# Patient Record
Sex: FEMALE | Race: WHITE | NOT HISPANIC OR LATINO | Employment: UNEMPLOYED | URBAN - METROPOLITAN AREA
[De-identification: names, ages, dates, MRNs, and addresses within clinical notes are randomized per-mention and may not be internally consistent; named-entity substitution may affect disease eponyms.]

---

## 2024-03-24 ENCOUNTER — APPOINTMENT (OUTPATIENT)
Dept: CT IMAGING | Facility: HOSPITAL | Age: 59
End: 2024-03-24
Payer: MEDICAID

## 2024-03-24 ENCOUNTER — HOSPITAL ENCOUNTER (OUTPATIENT)
Facility: HOSPITAL | Age: 59
Discharge: HOME | End: 2024-03-25
Attending: INTERNAL MEDICINE | Admitting: INTERNAL MEDICINE
Payer: MEDICAID

## 2024-03-24 DIAGNOSIS — R11.2 NAUSEA AND VOMITING, UNSPECIFIED VOMITING TYPE: Primary | ICD-10-CM

## 2024-03-24 DIAGNOSIS — R93.5 ABNORMAL CT OF THE ABDOMEN: ICD-10-CM

## 2024-03-24 PROBLEM — F41.9 ANXIETY DISORDER: Status: ACTIVE | Noted: 2024-03-24

## 2024-03-24 PROBLEM — I21.4 NSTEMI (NON-ST ELEVATED MYOCARDIAL INFARCTION): Status: ACTIVE | Noted: 2024-03-24

## 2024-03-24 PROBLEM — I10 ESSENTIAL HYPERTENSION: Status: ACTIVE | Noted: 2024-03-24

## 2024-03-24 PROBLEM — G89.29 CHRONIC PAIN: Status: ACTIVE | Noted: 2024-03-24

## 2024-03-24 PROBLEM — F32.A DEPRESSION: Status: ACTIVE | Noted: 2024-03-24

## 2024-03-24 PROBLEM — R10.9 ABDOMINAL PAIN: Status: ACTIVE | Noted: 2024-03-24

## 2024-03-24 PROBLEM — K62.5 RECTAL BLEEDING: Status: ACTIVE | Noted: 2024-03-24

## 2024-03-24 LAB
ALBUMIN SERPL-MCNC: 3.4 G/DL (ref 3.5–5.2)
ALBUMIN/GLOB SERPL: 1.3 G/DL
ALP SERPL-CCNC: 90 U/L (ref 39–117)
ALT SERPL W P-5'-P-CCNC: 11 U/L (ref 1–33)
ANION GAP SERPL CALCULATED.3IONS-SCNC: 12 MMOL/L (ref 5–15)
AST SERPL-CCNC: 22 U/L (ref 1–32)
BASOPHILS # BLD AUTO: 0.04 10*3/MM3 (ref 0–0.2)
BASOPHILS NFR BLD AUTO: 0.5 % (ref 0–1.5)
BILIRUB SERPL-MCNC: 0.3 MG/DL (ref 0–1.2)
BUN SERPL-MCNC: 7 MG/DL (ref 6–20)
BUN/CREAT SERPL: 11.9 (ref 7–25)
CALCIUM SPEC-SCNC: 8.8 MG/DL (ref 8.6–10.5)
CHLORIDE SERPL-SCNC: 106 MMOL/L (ref 98–107)
CO2 SERPL-SCNC: 22 MMOL/L (ref 22–29)
CREAT SERPL-MCNC: 0.59 MG/DL (ref 0.57–1)
DEPRECATED RDW RBC AUTO: 51.8 FL (ref 37–54)
EGFRCR SERPLBLD CKD-EPI 2021: 104 ML/MIN/1.73
EOSINOPHIL # BLD AUTO: 0.03 10*3/MM3 (ref 0–0.4)
EOSINOPHIL NFR BLD AUTO: 0.4 % (ref 0.3–6.2)
ERYTHROCYTE [DISTWIDTH] IN BLOOD BY AUTOMATED COUNT: 14.4 % (ref 12.3–15.4)
GLOBULIN UR ELPH-MCNC: 2.6 GM/DL
GLUCOSE SERPL-MCNC: 90 MG/DL (ref 65–99)
HCT VFR BLD AUTO: 44 % (ref 34–46.6)
HGB BLD-MCNC: 13.5 G/DL (ref 12–15.9)
IMM GRANULOCYTES # BLD AUTO: 0.03 10*3/MM3 (ref 0–0.05)
IMM GRANULOCYTES NFR BLD AUTO: 0.4 % (ref 0–0.5)
LYMPHOCYTES # BLD AUTO: 1.92 10*3/MM3 (ref 0.7–3.1)
LYMPHOCYTES NFR BLD AUTO: 22.6 % (ref 19.6–45.3)
MCH RBC QN AUTO: 29.8 PG (ref 26.6–33)
MCHC RBC AUTO-ENTMCNC: 30.7 G/DL (ref 31.5–35.7)
MCV RBC AUTO: 97.1 FL (ref 79–97)
MONOCYTES # BLD AUTO: 0.81 10*3/MM3 (ref 0.1–0.9)
MONOCYTES NFR BLD AUTO: 9.5 % (ref 5–12)
NEUTROPHILS NFR BLD AUTO: 5.66 10*3/MM3 (ref 1.7–7)
NEUTROPHILS NFR BLD AUTO: 66.6 % (ref 42.7–76)
NRBC BLD AUTO-RTO: 0 /100 WBC (ref 0–0.2)
NT-PROBNP SERPL-MCNC: 9110 PG/ML (ref 0–900)
PLATELET # BLD AUTO: 322 10*3/MM3 (ref 140–450)
PMV BLD AUTO: 9.8 FL (ref 6–12)
POTASSIUM SERPL-SCNC: 3.6 MMOL/L (ref 3.5–5.2)
PROT SERPL-MCNC: 6 G/DL (ref 6–8.5)
RBC # BLD AUTO: 4.53 10*6/MM3 (ref 3.77–5.28)
SODIUM SERPL-SCNC: 140 MMOL/L (ref 136–145)
TROPONIN T SERPL HS-MCNC: 26 NG/L
WBC NRBC COR # BLD AUTO: 8.49 10*3/MM3 (ref 3.4–10.8)

## 2024-03-24 PROCEDURE — 25010000002 ONDANSETRON PER 1 MG: Performed by: NURSE PRACTITIONER

## 2024-03-24 PROCEDURE — 74176 CT ABD & PELVIS W/O CONTRAST: CPT

## 2024-03-24 PROCEDURE — 93010 ELECTROCARDIOGRAM REPORT: CPT | Performed by: INTERNAL MEDICINE

## 2024-03-24 PROCEDURE — 25810000003 SODIUM CHLORIDE 0.9 % SOLUTION: Performed by: NURSE PRACTITIONER

## 2024-03-24 PROCEDURE — 83880 ASSAY OF NATRIURETIC PEPTIDE: CPT | Performed by: NURSE PRACTITIONER

## 2024-03-24 PROCEDURE — 93005 ELECTROCARDIOGRAM TRACING: CPT | Performed by: NURSE PRACTITIONER

## 2024-03-24 PROCEDURE — 96374 THER/PROPH/DIAG INJ IV PUSH: CPT

## 2024-03-24 PROCEDURE — 85025 COMPLETE CBC W/AUTO DIFF WBC: CPT | Performed by: NURSE PRACTITIONER

## 2024-03-24 PROCEDURE — 80053 COMPREHEN METABOLIC PANEL: CPT | Performed by: NURSE PRACTITIONER

## 2024-03-24 PROCEDURE — 84484 ASSAY OF TROPONIN QUANT: CPT | Performed by: NURSE PRACTITIONER

## 2024-03-24 PROCEDURE — 25010000002 ONDANSETRON PER 1 MG: Performed by: INTERNAL MEDICINE

## 2024-03-24 PROCEDURE — 25810000003 SODIUM CHLORIDE 0.9 % SOLUTION: Performed by: INTERNAL MEDICINE

## 2024-03-24 PROCEDURE — 96361 HYDRATE IV INFUSION ADD-ON: CPT

## 2024-03-24 RX ORDER — LISINOPRIL 20 MG/1
20 TABLET ORAL DAILY
Status: DISCONTINUED | OUTPATIENT
Start: 2024-03-25 | End: 2024-03-25 | Stop reason: HOSPADM

## 2024-03-24 RX ORDER — ONDANSETRON 2 MG/ML
4 INJECTION INTRAMUSCULAR; INTRAVENOUS EVERY 6 HOURS PRN
Status: DISCONTINUED | OUTPATIENT
Start: 2024-03-24 | End: 2024-03-24 | Stop reason: SDUPTHER

## 2024-03-24 RX ORDER — SODIUM CHLORIDE 0.9 % (FLUSH) 0.9 %
10 SYRINGE (ML) INJECTION AS NEEDED
Status: DISCONTINUED | OUTPATIENT
Start: 2024-03-24 | End: 2024-03-25 | Stop reason: HOSPADM

## 2024-03-24 RX ORDER — SODIUM CHLORIDE 0.9 % (FLUSH) 0.9 %
10 SYRINGE (ML) INJECTION EVERY 12 HOURS SCHEDULED
Status: DISCONTINUED | OUTPATIENT
Start: 2024-03-24 | End: 2024-03-25 | Stop reason: HOSPADM

## 2024-03-24 RX ORDER — ENOXAPARIN SODIUM 100 MG/ML
1 INJECTION SUBCUTANEOUS ONCE
Status: DISCONTINUED | OUTPATIENT
Start: 2024-03-24 | End: 2024-03-24

## 2024-03-24 RX ORDER — PANTOPRAZOLE SODIUM 40 MG/10ML
40 INJECTION, POWDER, LYOPHILIZED, FOR SOLUTION INTRAVENOUS
Status: DISCONTINUED | OUTPATIENT
Start: 2024-03-25 | End: 2024-03-25 | Stop reason: HOSPADM

## 2024-03-24 RX ORDER — BISACODYL 10 MG
10 SUPPOSITORY, RECTAL RECTAL DAILY PRN
Status: DISCONTINUED | OUTPATIENT
Start: 2024-03-24 | End: 2024-03-25 | Stop reason: HOSPADM

## 2024-03-24 RX ORDER — BUPRENORPHINE HYDROCHLORIDE AND NALOXONE HYDROCHLORIDE DIHYDRATE 8; 2 MG/1; MG/1
1 TABLET SUBLINGUAL 2 TIMES DAILY
Status: DISCONTINUED | OUTPATIENT
Start: 2024-03-24 | End: 2024-03-25 | Stop reason: HOSPADM

## 2024-03-24 RX ORDER — ONDANSETRON 2 MG/ML
4 INJECTION INTRAMUSCULAR; INTRAVENOUS EVERY 6 HOURS PRN
Status: DISCONTINUED | OUTPATIENT
Start: 2024-03-24 | End: 2024-03-25 | Stop reason: SDUPTHER

## 2024-03-24 RX ORDER — LISINOPRIL 20 MG/1
20 TABLET ORAL DAILY
COMMUNITY

## 2024-03-24 RX ORDER — FLUOXETINE HYDROCHLORIDE 20 MG/1
40 CAPSULE ORAL DAILY
Status: DISCONTINUED | OUTPATIENT
Start: 2024-03-25 | End: 2024-03-25 | Stop reason: HOSPADM

## 2024-03-24 RX ORDER — NITROGLYCERIN 0.4 MG/1
0.4 TABLET SUBLINGUAL
Status: DISCONTINUED | OUTPATIENT
Start: 2024-03-24 | End: 2024-03-25 | Stop reason: HOSPADM

## 2024-03-24 RX ORDER — SODIUM CHLORIDE 9 MG/ML
40 INJECTION, SOLUTION INTRAVENOUS AS NEEDED
Status: DISCONTINUED | OUTPATIENT
Start: 2024-03-24 | End: 2024-03-25 | Stop reason: HOSPADM

## 2024-03-24 RX ORDER — SODIUM CHLORIDE 9 MG/ML
100 INJECTION, SOLUTION INTRAVENOUS CONTINUOUS
Status: DISCONTINUED | OUTPATIENT
Start: 2024-03-24 | End: 2024-03-25 | Stop reason: HOSPADM

## 2024-03-24 RX ORDER — ONDANSETRON 4 MG/1
4 TABLET, ORALLY DISINTEGRATING ORAL EVERY 6 HOURS PRN
Status: DISCONTINUED | OUTPATIENT
Start: 2024-03-24 | End: 2024-03-25 | Stop reason: SDUPTHER

## 2024-03-24 RX ORDER — AMOXICILLIN 250 MG
2 CAPSULE ORAL 2 TIMES DAILY PRN
Status: DISCONTINUED | OUTPATIENT
Start: 2024-03-24 | End: 2024-03-25 | Stop reason: HOSPADM

## 2024-03-24 RX ORDER — ACETAMINOPHEN 325 MG/1
650 TABLET ORAL EVERY 4 HOURS PRN
Status: DISCONTINUED | OUTPATIENT
Start: 2024-03-24 | End: 2024-03-25 | Stop reason: HOSPADM

## 2024-03-24 RX ORDER — ACETAMINOPHEN 650 MG/1
650 SUPPOSITORY RECTAL EVERY 4 HOURS PRN
Status: DISCONTINUED | OUTPATIENT
Start: 2024-03-24 | End: 2024-03-25 | Stop reason: HOSPADM

## 2024-03-24 RX ORDER — ACETAMINOPHEN 160 MG/5ML
650 SOLUTION ORAL EVERY 4 HOURS PRN
Status: DISCONTINUED | OUTPATIENT
Start: 2024-03-24 | End: 2024-03-25 | Stop reason: HOSPADM

## 2024-03-24 RX ORDER — FLUOXETINE HYDROCHLORIDE 40 MG/1
40 CAPSULE ORAL DAILY
COMMUNITY

## 2024-03-24 RX ORDER — BUPRENORPHINE HYDROCHLORIDE AND NALOXONE HYDROCHLORIDE DIHYDRATE 8; 2 MG/1; MG/1
1 TABLET SUBLINGUAL 2 TIMES DAILY
COMMUNITY

## 2024-03-24 RX ORDER — BISACODYL 5 MG/1
5 TABLET, DELAYED RELEASE ORAL DAILY PRN
Status: DISCONTINUED | OUTPATIENT
Start: 2024-03-24 | End: 2024-03-25 | Stop reason: HOSPADM

## 2024-03-24 RX ORDER — POLYETHYLENE GLYCOL 3350 17 G/17G
17 POWDER, FOR SOLUTION ORAL DAILY PRN
Status: DISCONTINUED | OUTPATIENT
Start: 2024-03-24 | End: 2024-03-25 | Stop reason: HOSPADM

## 2024-03-24 RX ADMIN — Medication 10 ML: at 21:42

## 2024-03-24 RX ADMIN — Medication 10 ML: at 21:43

## 2024-03-24 RX ADMIN — ONDANSETRON 4 MG: 2 INJECTION INTRAMUSCULAR; INTRAVENOUS at 23:00

## 2024-03-24 RX ADMIN — SODIUM CHLORIDE 100 ML/HR: 9 INJECTION, SOLUTION INTRAVENOUS at 21:43

## 2024-03-24 RX ADMIN — BUPRENORPHINE HYDROCHLORIDE AND NALOXONE HYDROCHLORIDE DIHYDRATE 1 TABLET: 8; 2 TABLET SUBLINGUAL at 22:19

## 2024-03-25 ENCOUNTER — ANESTHESIA (OUTPATIENT)
Dept: GASTROENTEROLOGY | Facility: HOSPITAL | Age: 59
End: 2024-03-25
Payer: MEDICAID

## 2024-03-25 ENCOUNTER — INPATIENT HOSPITAL (OUTPATIENT)
Dept: URBAN - METROPOLITAN AREA HOSPITAL 84 | Facility: HOSPITAL | Age: 59
End: 2024-03-25
Payer: COMMERCIAL

## 2024-03-25 ENCOUNTER — ANESTHESIA EVENT (OUTPATIENT)
Dept: GASTROENTEROLOGY | Facility: HOSPITAL | Age: 59
End: 2024-03-25
Payer: MEDICAID

## 2024-03-25 ENCOUNTER — APPOINTMENT (OUTPATIENT)
Dept: OTHER | Facility: HOSPITAL | Age: 59
End: 2024-03-25
Payer: MEDICAID

## 2024-03-25 VITALS
HEART RATE: 83 BPM | SYSTOLIC BLOOD PRESSURE: 134 MMHG | DIASTOLIC BLOOD PRESSURE: 81 MMHG | OXYGEN SATURATION: 95 % | TEMPERATURE: 99 F | BODY MASS INDEX: 27.85 KG/M2 | RESPIRATION RATE: 13 BRPM | HEIGHT: 67 IN | WEIGHT: 177.47 LBS

## 2024-03-25 DIAGNOSIS — R11.14 BILIOUS VOMITING: ICD-10-CM

## 2024-03-25 DIAGNOSIS — R79.89 OTHER SPECIFIED ABNORMAL FINDINGS OF BLOOD CHEMISTRY: ICD-10-CM

## 2024-03-25 DIAGNOSIS — R07.9 CHEST PAIN, UNSPECIFIED: ICD-10-CM

## 2024-03-25 DIAGNOSIS — R10.12 LEFT UPPER QUADRANT PAIN: ICD-10-CM

## 2024-03-25 DIAGNOSIS — K29.50 UNSPECIFIED CHRONIC GASTRITIS WITHOUT BLEEDING: ICD-10-CM

## 2024-03-25 DIAGNOSIS — R93.5 ABNORMAL FINDINGS ON DIAGNOSTIC IMAGING OF OTHER ABDOMINAL R: ICD-10-CM

## 2024-03-25 DIAGNOSIS — R11.2 NAUSEA WITH VOMITING, UNSPECIFIED: ICD-10-CM

## 2024-03-25 DIAGNOSIS — Z90.49 ACQUIRED ABSENCE OF OTHER SPECIFIED PARTS OF DIGESTIVE TRACT: ICD-10-CM

## 2024-03-25 PROBLEM — R10.9 ABDOMINAL PAIN: Status: RESOLVED | Noted: 2024-03-24 | Resolved: 2024-03-25

## 2024-03-25 PROBLEM — F32.A DEPRESSION: Status: RESOLVED | Noted: 2024-03-24 | Resolved: 2024-03-25

## 2024-03-25 PROBLEM — I21.4 NSTEMI (NON-ST ELEVATED MYOCARDIAL INFARCTION): Status: RESOLVED | Noted: 2024-03-24 | Resolved: 2024-03-25

## 2024-03-25 PROBLEM — G89.29 CHRONIC PAIN: Status: RESOLVED | Noted: 2024-03-24 | Resolved: 2024-03-25

## 2024-03-25 PROBLEM — K62.5 RECTAL BLEEDING: Status: RESOLVED | Noted: 2024-03-24 | Resolved: 2024-03-25

## 2024-03-25 PROBLEM — F41.9 ANXIETY DISORDER: Status: RESOLVED | Noted: 2024-03-24 | Resolved: 2024-03-25

## 2024-03-25 LAB
ANION GAP SERPL CALCULATED.3IONS-SCNC: 11 MMOL/L (ref 5–15)
BASOPHILS # BLD AUTO: 0.02 10*3/MM3 (ref 0–0.2)
BASOPHILS NFR BLD AUTO: 0.3 % (ref 0–1.5)
BUN SERPL-MCNC: 6 MG/DL (ref 6–20)
BUN/CREAT SERPL: 11.3 (ref 7–25)
CALCIUM SPEC-SCNC: 8.2 MG/DL (ref 8.6–10.5)
CHLORIDE SERPL-SCNC: 104 MMOL/L (ref 98–107)
CO2 SERPL-SCNC: 24 MMOL/L (ref 22–29)
CREAT SERPL-MCNC: 0.53 MG/DL (ref 0.57–1)
DEPRECATED RDW RBC AUTO: 51.6 FL (ref 37–54)
EGFRCR SERPLBLD CKD-EPI 2021: 106.7 ML/MIN/1.73
EOSINOPHIL # BLD AUTO: 0.07 10*3/MM3 (ref 0–0.4)
EOSINOPHIL NFR BLD AUTO: 1.2 % (ref 0.3–6.2)
ERYTHROCYTE [DISTWIDTH] IN BLOOD BY AUTOMATED COUNT: 14.6 % (ref 12.3–15.4)
GEN 5 2HR TROPONIN T REFLEX: 25 NG/L
GLUCOSE SERPL-MCNC: 82 MG/DL (ref 65–99)
HCT VFR BLD AUTO: 41 % (ref 34–46.6)
HGB BLD-MCNC: 12.6 G/DL (ref 12–15.9)
IMM GRANULOCYTES # BLD AUTO: 0.02 10*3/MM3 (ref 0–0.05)
IMM GRANULOCYTES NFR BLD AUTO: 0.3 % (ref 0–0.5)
LYMPHOCYTES # BLD AUTO: 1.8 10*3/MM3 (ref 0.7–3.1)
LYMPHOCYTES NFR BLD AUTO: 29.7 % (ref 19.6–45.3)
MAGNESIUM SERPL-MCNC: 2.1 MG/DL (ref 1.6–2.6)
MCH RBC QN AUTO: 29.6 PG (ref 26.6–33)
MCHC RBC AUTO-ENTMCNC: 30.7 G/DL (ref 31.5–35.7)
MCV RBC AUTO: 96.5 FL (ref 79–97)
MONOCYTES # BLD AUTO: 0.66 10*3/MM3 (ref 0.1–0.9)
MONOCYTES NFR BLD AUTO: 10.9 % (ref 5–12)
NEUTROPHILS NFR BLD AUTO: 3.5 10*3/MM3 (ref 1.7–7)
NEUTROPHILS NFR BLD AUTO: 57.6 % (ref 42.7–76)
NRBC BLD AUTO-RTO: 0 /100 WBC (ref 0–0.2)
PHOSPHATE SERPL-MCNC: 3.5 MG/DL (ref 2.5–4.5)
PLATELET # BLD AUTO: 298 10*3/MM3 (ref 140–450)
PMV BLD AUTO: 10.1 FL (ref 6–12)
POTASSIUM SERPL-SCNC: 3.5 MMOL/L (ref 3.5–5.2)
QT INTERVAL: 439 MS
QTC INTERVAL: 539 MS
RBC # BLD AUTO: 4.25 10*6/MM3 (ref 3.77–5.28)
SODIUM SERPL-SCNC: 139 MMOL/L (ref 136–145)
TROPONIN T DELTA: -1 NG/L
WBC NRBC COR # BLD AUTO: 6.07 10*3/MM3 (ref 3.4–10.8)

## 2024-03-25 PROCEDURE — 85025 COMPLETE CBC W/AUTO DIFF WBC: CPT | Performed by: STUDENT IN AN ORGANIZED HEALTH CARE EDUCATION/TRAINING PROGRAM

## 2024-03-25 PROCEDURE — 99222 1ST HOSP IP/OBS MODERATE 55: CPT | Mod: 25 | Performed by: NURSE PRACTITIONER

## 2024-03-25 PROCEDURE — 25810000003 SODIUM CHLORIDE 0.9 % SOLUTION: Performed by: INTERNAL MEDICINE

## 2024-03-25 PROCEDURE — 0DB68ZX EXCISION OF STOMACH, VIA NATURAL OR ARTIFICIAL OPENING ENDOSCOPIC, DIAGNOSTIC: ICD-10-PCS | Performed by: INTERNAL MEDICINE

## 2024-03-25 PROCEDURE — 80048 BASIC METABOLIC PNL TOTAL CA: CPT | Performed by: STUDENT IN AN ORGANIZED HEALTH CARE EDUCATION/TRAINING PROGRAM

## 2024-03-25 PROCEDURE — 83735 ASSAY OF MAGNESIUM: CPT | Performed by: STUDENT IN AN ORGANIZED HEALTH CARE EDUCATION/TRAINING PROGRAM

## 2024-03-25 PROCEDURE — 99222 1ST HOSP IP/OBS MODERATE 55: CPT | Performed by: INTERNAL MEDICINE

## 2024-03-25 PROCEDURE — 25010000002 PROPOFOL 200 MG/20ML EMULSION

## 2024-03-25 PROCEDURE — 25810000003 SODIUM CHLORIDE 0.9 % SOLUTION: Performed by: NURSE PRACTITIONER

## 2024-03-25 PROCEDURE — 84100 ASSAY OF PHOSPHORUS: CPT | Performed by: STUDENT IN AN ORGANIZED HEALTH CARE EDUCATION/TRAINING PROGRAM

## 2024-03-25 PROCEDURE — 43239 EGD BIOPSY SINGLE/MULTIPLE: CPT | Performed by: INTERNAL MEDICINE

## 2024-03-25 PROCEDURE — 96361 HYDRATE IV INFUSION ADD-ON: CPT

## 2024-03-25 PROCEDURE — 88305 TISSUE EXAM BY PATHOLOGIST: CPT | Performed by: INTERNAL MEDICINE

## 2024-03-25 PROCEDURE — 84484 ASSAY OF TROPONIN QUANT: CPT | Performed by: NURSE PRACTITIONER

## 2024-03-25 RX ORDER — LIDOCAINE HYDROCHLORIDE 20 MG/ML
INJECTION, SOLUTION INFILTRATION; PERINEURAL AS NEEDED
Status: DISCONTINUED | OUTPATIENT
Start: 2024-03-25 | End: 2024-03-25 | Stop reason: SURG

## 2024-03-25 RX ORDER — ONDANSETRON 4 MG/1
4 TABLET, ORALLY DISINTEGRATING ORAL EVERY 6 HOURS PRN
Status: DISCONTINUED | OUTPATIENT
Start: 2024-03-25 | End: 2024-03-25 | Stop reason: HOSPADM

## 2024-03-25 RX ORDER — TERIPARATIDE 250 UG/ML
20 INJECTION, SOLUTION SUBCUTANEOUS DAILY
COMMUNITY

## 2024-03-25 RX ORDER — PROMETHAZINE HYDROCHLORIDE 12.5 MG/1
12.5 TABLET ORAL EVERY 6 HOURS PRN
COMMUNITY

## 2024-03-25 RX ORDER — PROPOFOL 10 MG/ML
INJECTION, EMULSION INTRAVENOUS AS NEEDED
Status: DISCONTINUED | OUTPATIENT
Start: 2024-03-25 | End: 2024-03-25 | Stop reason: SURG

## 2024-03-25 RX ORDER — ONDANSETRON 4 MG/1
4 TABLET, ORALLY DISINTEGRATING ORAL EVERY 8 HOURS PRN
Qty: 15 TABLET | Refills: 0 | Status: CANCELLED | OUTPATIENT
Start: 2024-03-25 | End: 2024-03-30

## 2024-03-25 RX ORDER — SODIUM CHLORIDE 9 MG/ML
INJECTION, SOLUTION INTRAVENOUS CONTINUOUS PRN
Status: DISCONTINUED | OUTPATIENT
Start: 2024-03-25 | End: 2024-03-25 | Stop reason: SURG

## 2024-03-25 RX ORDER — PANTOPRAZOLE SODIUM 40 MG/1
40 TABLET, DELAYED RELEASE ORAL DAILY
Qty: 30 TABLET | Refills: 0 | Status: SHIPPED | OUTPATIENT
Start: 2024-03-25 | End: 2024-04-24

## 2024-03-25 RX ORDER — ONDANSETRON 4 MG/1
4 TABLET, ORALLY DISINTEGRATING ORAL EVERY 8 HOURS PRN
Qty: 15 TABLET | Refills: 0 | Status: SHIPPED | OUTPATIENT
Start: 2024-03-25 | End: 2024-03-30

## 2024-03-25 RX ORDER — ACETAMINOPHEN 325 MG/1
650 TABLET ORAL EVERY 4 HOURS PRN
Start: 2024-03-25

## 2024-03-25 RX ORDER — ONDANSETRON 2 MG/ML
4 INJECTION INTRAMUSCULAR; INTRAVENOUS EVERY 6 HOURS PRN
Status: DISCONTINUED | OUTPATIENT
Start: 2024-03-25 | End: 2024-03-25 | Stop reason: HOSPADM

## 2024-03-25 RX ORDER — ACETAMINOPHEN 325 MG/1
650 TABLET ORAL EVERY 4 HOURS PRN
Status: CANCELLED
Start: 2024-03-25

## 2024-03-25 RX ADMIN — SODIUM CHLORIDE 100 ML/HR: 9 INJECTION, SOLUTION INTRAVENOUS at 08:32

## 2024-03-25 RX ADMIN — FLUOXETINE 40 MG: 20 CAPSULE ORAL at 08:31

## 2024-03-25 RX ADMIN — PROPOFOL 20 MG: 10 INJECTION, EMULSION INTRAVENOUS at 14:24

## 2024-03-25 RX ADMIN — PROPOFOL 40 MG: 10 INJECTION, EMULSION INTRAVENOUS at 14:27

## 2024-03-25 RX ADMIN — PROPOFOL 40 MG: 10 INJECTION, EMULSION INTRAVENOUS at 14:25

## 2024-03-25 RX ADMIN — LIDOCAINE HYDROCHLORIDE 100 MG: 20 INJECTION, SOLUTION INFILTRATION; PERINEURAL at 14:20

## 2024-03-25 RX ADMIN — LISINOPRIL 20 MG: 20 TABLET ORAL at 08:31

## 2024-03-25 RX ADMIN — SODIUM CHLORIDE: 9 INJECTION, SOLUTION INTRAVENOUS at 14:09

## 2024-03-25 RX ADMIN — PANTOPRAZOLE SODIUM 40 MG: 40 INJECTION, POWDER, FOR SOLUTION INTRAVENOUS at 05:44

## 2024-03-25 RX ADMIN — Medication 10 ML: at 08:32

## 2024-03-25 RX ADMIN — BUPRENORPHINE HYDROCHLORIDE AND NALOXONE HYDROCHLORIDE DIHYDRATE 1 TABLET: 8; 2 TABLET SUBLINGUAL at 08:31

## 2024-03-25 RX ADMIN — PROPOFOL 50 MG: 10 INJECTION, EMULSION INTRAVENOUS at 14:23

## 2024-03-25 RX ADMIN — PROPOFOL 90 MG: 10 INJECTION, EMULSION INTRAVENOUS at 14:20

## 2024-03-25 NOTE — PLAN OF CARE
Goal Outcome Evaluation:  Plan of Care Reviewed With: patient        Progress: no change  Outcome Evaluation: No significant events overnight, c/o some nausea after only a few bites of pudding last night, NPO this AM until seen by MDs. Vitals stable throughout the shift. IV fluids infusing. No c/o chest pain since arrival.

## 2024-03-25 NOTE — PAYOR COMM NOTE
"Clinical for case# [id number]      Inpatient order 3/24/24    [facility] and clinical [facility].   Clinical is limited at this time, UR to fax additional clinical information tomorrow.   ===========  AUTHORIZATION PENDING:   PLEASE FAX OR CALL DETERMINATION TO CONTACT BELOW:       THANK YOU,    MONIQUE Ring, RN  Utilization Review  [facility]  Phone: [phone]  Fax: [phone]      NPI: [id number]  Tax ID: [id number]      Marietta Alvarez (59 y.o. Female)       Date of Birth   1965    Social Security Number       Address   [address] [place]    Home Phone   [phone]    MRN   [id number]       Confucianist   None    Marital Status                               Admission Date   3/24/24    Admission Type   Urgent    Admitting Provider   Devon Clark MD    Attending Provider   Regan Porter MD    Department, Room/Bed   [facility] PROGRESS CARE, 2104/1       Discharge Date       Discharge Disposition       Discharge Destination                                 Attending Provider: Regan Porter MD    Allergies: Tape    Isolation: Spore   Infection: C.difficile (rule out) (03/25/24)   Code Status: CPR    Ht: 170.2 cm (67\")   Wt: 80.5 kg (177 lb 7.5 oz)    Admission Cmt: None   Principal Problem: NSTEMI (non-ST elevated myocardial infarction) [I21.4]                   Active Insurance as of 3/24/2024       Primary Coverage       Payor Plan Insurance Group Employer/Plan Group    [facility] [id number]        Payor Plan Address Payor Plan Phone Number Payor Plan Fax Number Effective Dates    [address]   3/1/2024 - None Entered    [facility] [id number]         Subscriber Name Subscriber Birth Date Member ID       MARIETTA ALVAREZ 1965 [id number]                     Emergency Contacts        (Rel.) Home Phone Work Phone Mobile Phone    MAXI ALVAREZ " (Friend) -- -- [phone]                 History & Physical        Essing-Neha SalgadoNISHI fitzpatrick at 24       Attestation signed by Jania Jasso MD at 24 0326    I have reviewed this documentation and agree.                      [facility]  History & Physical    Patient Name: Marietta Alvarez  : 1965  MRN: [id number]  Primary Care Physician:  Lovely Jarrett MD  Date of admission: 3/24/2024  Date and Time of Service: 3/24/2024 at 2120    Subjective      Chief Complaint: chest pain     History of Present Illness: Marietta Alvarez is a 59 y.o. female with a CMH of anxiety, depression, insomnia, chronic neck pain, hyperlipidemia, hypertension, tobacco abuse who presented to [facility] on 3/24/2024 on transfer from [facility] emergency department where she reports she presented with complaints of abdominal pain, nausea and vomiting.  She also reports she had some rectal bleeding.  She reports she has been to the emergency department there 4 times over the past week and has had CTs done and was told it was her hemorrhoids that were bleeding and she had colitis.  She reports she was given antibiotics which she has finished.  She reports while she was in the ambulance she developed left-sided chest pain and shortness of air.  She denied any dizziness.  She denies any chest pain now.  Documents from [facility] did not include any CT abdomen and did not include any information as to why patient presented.  She reports she saw a cardiologist years ago for an abnormal rhythm but has never had a cardiac evaluation.  Labs from [facility] showed a troponin of 145 then 148, potassium 3.2 glucose 137 EKG from [facility] shows sinus tachycardia heart rate 102 T wave abnormality lateral leads, chest x-ray per radiology showed no active disease.  Aspirin was given at [facility] as well as a 1 L fluid bolus and Haldol.  Repeat  EKG, high-sensitivity troponin, CMP CBC and CT abdomen have been ordered and pending.  Cardiology was consulted from outlying facility.  Gastroenterology has been consulted.    Review of Systems   Constitutional: Negative.    HENT: Negative.     Eyes: Negative.    Respiratory:  Positive for shortness of breath.    Cardiovascular:  Positive for chest pain.   Gastrointestinal:  Positive for abdominal pain, anal bleeding, nausea and vomiting.   Endocrine: Negative.    Genitourinary: Negative.    Musculoskeletal: Negative.    Skin: Negative.    Allergic/Immunologic: Negative.    Neurological: Negative.    Hematological: Negative.    Psychiatric/Behavioral: Negative.     All other systems reviewed and are negative.      Personal History     Past Medical History:   Diagnosis Date    Anxiety     Depression     Hyperlipidemia     Hypertension        Past Surgical History:   Procedure Laterality Date    APPENDECTOMY      CHOLECYSTECTOMY      HYSTERECTOMY      JOINT REPLACEMENT      NECK SURGERY      TONSILLECTOMY         Family History: family history includes COPD in her father and mother; Hypertension in her father. Otherwise pertinent FHx was reviewed and not pertinent to current issue.    Social History:  reports that she has been smoking cigarettes. She started smoking about 44 years ago. She has a 22.1 pack-year smoking history. She has never used smokeless tobacco. She reports current drug use. She reports that she does not drink alcohol.    Home Medications:  Prior to Admission Medications       Prescriptions Last Dose Informant Patient Reported? Taking?    buprenorphine-naloxone (SUBOXONE) 8-2 MG per SL tablet  Self Yes No    Place 1 tablet under the tongue 2 (Two) Times a Day.    FLUoxetine (PROzac) 20 MG capsule  Self Yes No    Take 2 capsules by mouth Daily.    lisinopril (PRINIVIL,ZESTRIL) 20 MG tablet  Self Yes No    Take 1 tablet by mouth Daily.              Allergies:  Allergies   Allergen Reactions    Tape  Itching       Objective      Vitals:   Temp:  [99.3 °F (37.4 °C)] 99.3 °F (37.4 °C)  Heart Rate:  [92] 92  Resp:  [18] 18  BP: (143)/(97) 143/97  Body mass index is 28.87 kg/m².  Physical Exam  Vitals reviewed.   Constitutional:       Appearance: Normal appearance. She is obese.   HENT:      Head: Normocephalic and atraumatic.      Right Ear: External ear normal.      Left Ear: External ear normal.      Nose: Nose normal.      Mouth/Throat:      Mouth: Mucous membranes are moist.   Eyes:      Extraocular Movements: Extraocular movements intact.   Cardiovascular:      Rate and Rhythm: Normal rate and regular rhythm.      Heart sounds: Normal heart sounds.   Pulmonary:      Effort: Pulmonary effort is normal.      Breath sounds: Normal breath sounds.   Abdominal:      General: Bowel sounds are normal.      Palpations: Abdomen is soft.   Genitourinary:     Rectum: Normal.   Musculoskeletal:         General: Normal range of motion.      Cervical back: Normal range of motion and neck supple.   Skin:     General: Skin is warm and dry.   Neurological:      General: No focal deficit present.      Mental Status: She is alert and oriented to person, place, and time.   Psychiatric:         Mood and Affect: Mood normal.         Behavior: Behavior normal.         Thought Content: Thought content normal.         Judgment: Judgment normal.         Diagnostic Data:  Lab Results (last 24 hours)       Procedure Component Value Units Date/Time    CBC & Differential [[id number]]  (Abnormal) Collected: 03/24/24 2156    Specimen: Blood from Arm, Left Updated: 03/24/24 2209    Narrative:      The following orders were created for panel order CBC & Differential.  Procedure                               Abnormality         Status                     ---------                               -----------         ------                     CBC Auto Differential[[id number]]        Abnormal            Final result                 Please view results  for these tests on the individual orders.    CBC Auto Differential [[id number]]  (Abnormal) Collected: 03/24/24 2156    Specimen: Blood from Arm, Left Updated: 03/24/24 2209     WBC 8.49 10*3/mm3      RBC 4.53 10*6/mm3      Hemoglobin 13.5 g/dL      Hematocrit 44.0 %      MCV 97.1 fL      MCH 29.8 pg      MCHC 30.7 g/dL      RDW 14.4 %      RDW-SD 51.8 fl      MPV 9.8 fL      Platelets 322 10*3/mm3      Neutrophil % 66.6 %      Lymphocyte % 22.6 %      Monocyte % 9.5 %      Eosinophil % 0.4 %      Basophil % 0.5 %      Immature Grans % 0.4 %      Neutrophils, Absolute 5.66 10*3/mm3      Lymphocytes, Absolute 1.92 10*3/mm3      Monocytes, Absolute 0.81 10*3/mm3      Eosinophils, Absolute 0.03 10*3/mm3      Basophils, Absolute 0.04 10*3/mm3      Immature Grans, Absolute 0.03 10*3/mm3      nRBC 0.0 /100 WBC     High Sensitivity Troponin T [[id number]] Collected: 03/24/24 2156    Specimen: Blood from Arm, Left Updated: 03/24/24 2202    Comprehensive Metabolic Panel [[id number]] Collected: 03/24/24 2156    Specimen: Blood from Arm, Left Updated: 03/24/24 2202    BNP [[id number]] Collected: 03/24/24 2156    Specimen: Blood from Arm, Left Updated: 03/24/24 2202             Imaging Results (Last 24 Hours)       ** No results found for the last 24 hours. **              Assessment & Plan        This is a 59 y.o. female with:    Active and Resolved Problems  Active Hospital Problems    Diagnosis  POA    **NSTEMI (non-ST elevated myocardial infarction) [I21.4]  Yes     Priority: High    Abdominal pain [R10.9]  Yes     Priority: Medium    Rectal bleeding [K62.5]  Yes     Priority: Medium    Depression [F32.A]  Yes    Anxiety disorder [F41.9]  Yes    Chronic pain [G89.29]  Yes    Essential hypertension [I10]  Yes      Resolved Hospital Problems   No resolved problems to display.     Non-STEMI with chest pain and shortness of air now resolved without intervention troponin 145 then 148 per outlying facility, repeat troponin and EKG  ordered here and pending, [facility], no Lovenox or heparin given rectal bleeding, 2D echo in a.m.    Abdominal pain, patient reports she has been dealing with colitis finished antibiotics WBC [facility] CBC pending, gastroenterology consulted, 4 mg IV Zofran every 6 hours as needed nausea    Rectal bleeding, patient reports she was told it was from hemorrhoids, [facility] CBC pending gastroenterology consulted    Depression/anxiety, on home Prozac reordered    Chronic pain, on home Suboxone verified by sidebar summary recent prescription reordered    Essential hypertension, reordered home lisinopril, monitor BP    DVT prophylaxis:  Mechanical DVT prophylaxis orders are present.        The patient desires to be as follows:    CODE STATUS:    Code Status (Patient has no pulse and is not breathing): CPR (Attempt to Resuscitate)  Medical Interventions (Patient has pulse or is breathing): Full Support          Admission Status:  I believe this patient meets inpatient  status.    Expected Length of Stay: pending clinical course     PDMP and Medication Dispenses via Sidebar reviewed and consistent with patient reported medications.    I discussed the patient's findings and my recommendations with patient.      Signature:     This document has been electronically signed by NISHI Issa on March 24, 2024 22:12 EDT   [facility] Hospitalist Team    Electronically signed by Jania Jasso MD at 03/25/24 0323       Vital Signs (last day)       Date/Time Temp Temp src Pulse Resp BP Patient Position SpO2    03/25/24 1300 98.5 (36.9) Oral 83 17 110/68 Lying 95    03/25/24 0900 97.6 (36.4) Axillary 86 15 141/95 Lying 94    03/25/24 0515 98 (36.7) Oral 83 14 103/63 Lying 91    03/25/24 0400 -- -- 80 -- -- -- 89    03/25/24 0148 97.9 (36.6) Oral 88 17 117/83 Lying 94     03/24/24 2200 -- -- 98 -- 132/92 -- 97    03/24/24 2100 -- -- 91 -- 136/97 -- 96    03/24/24 2048 99.3 (37.4) Oral 92 18 143/97 Lying 96          Oxygen Therapy (last day)       Date/Time SpO2 Device (Oxygen Therapy) Flow (L/min) Oxygen Concentration (%) ETCO2 (mmHg)    03/25/24 1300 95 room air -- -- --    03/25/24 1215 -- room air -- -- --    03/25/24 0900 94 room air -- -- --    03/25/24 0815 -- room air -- -- --    03/25/24 0515 91 room air -- -- --    03/25/24 0400 89 -- -- -- --    03/25/24 0148 94 room air -- -- --    03/24/24 2200 97 -- -- -- --    03/24/24 2100 96 room air -- -- --    03/24/24 2048 96 room air -- -- --          Facility-Administered Medications as of 3/25/2024   Medication Dose Route Frequency Provider Last Rate Last Admin    acetaminophen (TYLENOL) tablet 650 mg  650 mg Oral Q4H PRN Yanci Walters APRN        Or    acetaminophen (TYLENOL) 160 MG/5ML oral solution 650 mg  650 mg Oral Q4H PRN Yanci Walters APRN        Or    acetaminophen (TYLENOL) suppository 650 mg  650 mg Rectal Q4H PRN Yanci Walters APRN        sennosides-docusate (PERICOLACE) 8.6-50 MG per tablet 2 tablet  2 tablet Oral BID PRN Yanci Walters APRN        And    polyethylene glycol (MIRALAX) packet 17 g  17 g Oral Daily PRN Yanci Walters APRN        And    bisacodyl (DULCOLAX) EC tablet 5 mg  5 mg Oral Daily PRN Yanci Walters APRN        And    bisacodyl (DULCOLAX) suppository 10 mg  10 mg Rectal Daily PRN Yanci Walters APRN        buprenorphine-naloxone (SUBOXONE) 8-2 MG per SL tablet 1 tablet  1 tablet Sublingual BID Yanci Walters APRN   1 tablet at 03/25/24 0831    FLUoxetine (PROzac) capsule 40 mg  40 mg Oral Daily Yanci Walters APRN   40 mg at 03/25/24 0831    lisinopril (PRINIVIL,ZESTRIL) tablet 20 mg  20 mg Oral Daily Yanci Walters APRN   20 mg at 03/25/24 0831    nitroglycerin (NITROSTAT) SL tablet 0.4 mg  0.4 mg Sublingual Q5  Min PRN Renato-Yanci Salgado APRN        ondansetron ODT (ZOFRAN-ODT) disintegrating tablet 4 mg  4 mg Oral Q6H PRN Renato-Yanci Salgado APRN        Or    ondansetron (ZOFRAN) injection 4 mg  4 mg Intravenous Q6H PRN Renato-Yanci Salgado APRN   4 mg at 03/24/24 2300    pantoprazole (PROTONIX) injection 40 mg  40 mg Intravenous Q AM [place]-Yanci Salgado APRN   40 mg at 03/25/24 0544    sodium chloride 0.9 % flush 10 mL  10 mL Intravenous Q12H Essing-Naomi, Yanci, APRN   10 mL at 03/25/24 0832    sodium chloride 0.9 % flush 10 mL  10 mL Intravenous PRN Essing-Naomi, Yanci, APRN   10 mL at 03/24/24 2142    sodium chloride 0.9 % infusion 40 mL  40 mL Intravenous PRN Sylvestering-Yanci Salgado, APRN        sodium chloride 0.9 % infusion  100 mL/hr Intravenous Continuous Renato-Yanci Salgado APRN 100 mL/hr at 03/25/24 0832 100 mL/hr at 03/25/24 0832     Lab Results (last 24 hours)       Procedure Component Value Units Date/Time    Phosphorus [[id number]]  (Normal) Collected: 03/25/24 0912    Specimen: Blood Updated: 03/25/24 0951     Phosphorus 3.5 mg/dL     Magnesium [[id number]]  (Normal) Collected: 03/25/24 0912    Specimen: Blood Updated: 03/25/24 0951     Magnesium 2.1 mg/dL     Basic Metabolic Panel [[id number]]  (Abnormal) Collected: 03/25/24 0912    Specimen: Blood Updated: 03/25/24 0951     Glucose 82 mg/dL      BUN 6 mg/dL      Creatinine 0.53 mg/dL      Sodium 139 mmol/L      Potassium 3.5 mmol/L      Comment: Slight hemolysis detected by analyzer. Result may be falsely elevated.        Chloride 104 mmol/L      CO2 24.0 mmol/L      Calcium 8.2 mg/dL      BUN/Creatinine Ratio 11.3     Anion Gap 11.0 mmol/L      eGFR 106.7 mL/min/1.73     Narrative:      GFR Normal >60  Chronic Kidney Disease <60  Kidney Failure <15      CBC & Differential [[id number]]  (Abnormal) Collected: 03/25/24 0912    Specimen: Blood Updated: 03/25/24 0928    Narrative:      The following orders were created for panel order CBC &  Differential.  Procedure                               Abnormality         Status                     ---------                               -----------         ------                     CBC Auto Differential[[id number]]        Abnormal            Final result                 Please view results for these tests on the individual orders.    CBC Auto Differential [[id number]]  (Abnormal) Collected: 03/25/24 0912    Specimen: Blood Updated: 03/25/24 0928     WBC 6.07 10*3/mm3      RBC 4.25 10*6/mm3      Hemoglobin 12.6 g/dL      Hematocrit 41.0 %      MCV 96.5 fL      MCH 29.6 pg      MCHC 30.7 g/dL      RDW 14.6 %      RDW-SD 51.6 fl      MPV 10.1 fL      Platelets 298 10*3/mm3      Neutrophil % 57.6 %      Lymphocyte % 29.7 %      Monocyte % 10.9 %      Eosinophil % 1.2 %      Basophil % 0.3 %      Immature Grans % 0.3 %      Neutrophils, Absolute 3.50 10*3/mm3      Lymphocytes, Absolute 1.80 10*3/mm3      Monocytes, Absolute 0.66 10*3/mm3      Eosinophils, Absolute 0.07 10*3/mm3      Basophils, Absolute 0.02 10*3/mm3      Immature Grans, Absolute 0.02 10*3/mm3      nRBC 0.0 /100 WBC     High Sensitivity Troponin T 2Hr [[id number]]  (Abnormal) Collected: 03/25/24 0001    Specimen: Blood Updated: 03/25/24 0037     HS Troponin T 25 ng/L      Troponin T Delta -1 ng/L     Narrative:      High Sensitive Troponin T Reference Range:  <14.0 ng/L- Negative Female for AMI  <22.0 ng/L- Negative Male for AMI  >=14 - Abnormal Female indicating possible myocardial injury.  >=22 - Abnormal Male indicating possible myocardial injury.   Clinicians would have to utilize clinical acumen, EKG, Troponin, and serial changes to determine if it is an Acute Myocardial Infarction or myocardial injury due to an underlying chronic condition.         High Sensitivity Troponin T [[id number]]  (Abnormal) Collected: 03/24/24 2156    Specimen: Blood from Arm, Left Updated: 03/24/24 2236     HS Troponin T 26 ng/L     Narrative:      High Sensitive  Troponin T Reference Range:  <14.0 ng/L- Negative Female for AMI  <22.0 ng/L- Negative Male for AMI  >=14 - Abnormal Female indicating possible myocardial injury.  >=22 - Abnormal Male indicating possible myocardial injury.   Clinicians would have to utilize clinical acumen, EKG, Troponin, and serial changes to determine if it is an Acute Myocardial Infarction or myocardial injury due to an underlying chronic condition.         Comprehensive Metabolic Panel [[id number]]  (Abnormal) Collected: 03/24/24 2156    Specimen: Blood from Arm, Left Updated: 03/24/24 2236     Glucose 90 mg/dL      BUN 7 mg/dL      Creatinine 0.59 mg/dL      Sodium 140 mmol/L      Potassium 3.6 mmol/L      Chloride 106 mmol/L      CO2 22.0 mmol/L      Calcium 8.8 mg/dL      Total Protein 6.0 g/dL      Albumin 3.4 g/dL      ALT (SGPT) 11 U/L      AST (SGOT) 22 U/L      Alkaline Phosphatase 90 U/L      Total Bilirubin 0.3 mg/dL      Globulin 2.6 gm/dL      A/G Ratio 1.3 g/dL      BUN/Creatinine Ratio 11.9     Anion Gap 12.0 mmol/L      eGFR 104.0 mL/min/1.73     Narrative:      GFR Normal >60  Chronic Kidney Disease <60  Kidney Failure <15      BNP [[id number]]  (Abnormal) Collected: 03/24/24 2156    Specimen: Blood from Arm, Left Updated: 03/24/24 2236     proBNP 9,110.0 pg/mL     Narrative:      This assay is used as an aid in the diagnosis of individuals suspected of having heart failure. It can be used as an aid in the diagnosis of acute decompensated heart failure (ADHF) in patients presenting with signs and symptoms of ADHF to the emergency department (ED). In addition, NT-proBNP of <300 pg/mL indicates ADHF is not likely.    Age Range Result Interpretation  NT-proBNP Concentration (pg/mL:      <50             Positive            >450                   Gray                 300-450                    Negative             <300    50-75           Positive            >900                  Gray                300-900                  Negative             <300      >75             Positive            >1800                  Gray                300-1800                  Negative            <300    CBC & Differential [[id number]]  (Abnormal) Collected: 03/24/24 2156    Specimen: Blood from Arm, Left Updated: 03/24/24 2209    Narrative:      The following orders were created for panel order CBC & Differential.  Procedure                               Abnormality         Status                     ---------                               -----------         ------                     CBC Auto Differential[[id number]]        Abnormal            Final result                 Please view results for these tests on the individual orders.    CBC Auto Differential [[id number]]  (Abnormal) Collected: 03/24/24 2156    Specimen: Blood from Arm, Left Updated: 03/24/24 2209     WBC 8.49 10*3/mm3      RBC 4.53 10*6/mm3      Hemoglobin 13.5 g/dL      Hematocrit 44.0 %      MCV 97.1 fL      MCH 29.8 pg      MCHC 30.7 g/dL      RDW 14.4 %      RDW-SD 51.8 fl      MPV 9.8 fL      Platelets 322 10*3/mm3      Neutrophil % 66.6 %      Lymphocyte % 22.6 %      Monocyte % 9.5 %      Eosinophil % 0.4 %      Basophil % 0.5 %      Immature Grans % 0.4 %      Neutrophils, Absolute 5.66 10*3/mm3      Lymphocytes, Absolute 1.92 10*3/mm3      Monocytes, Absolute 0.81 10*3/mm3      Eosinophils, Absolute 0.03 10*3/mm3      Basophils, Absolute 0.04 10*3/mm3      Immature Grans, Absolute 0.03 10*3/mm3      nRBC 0.0 /100 WBC           Imaging Results (Last 48 Hours)       Procedure Component Value Units Date/Time    XR Outside Films [[id number]] Resulted: 03/25/24 0645     Updated: 03/25/24 0645    Narrative:      This procedure was auto-finalized with no dictation required.    XR Outside Films [[id number]] Resulted: 03/25/24 0644     Updated: 03/25/24 0644    Narrative:      This procedure was auto-finalized with no dictation required.    CT Abdomen Pelvis Without Contrast [[id number]] Collected:  03/24/24 2252     Updated: 03/25/24 0547    Narrative:      CT ABDOMEN PELVIS WO CONTRAST    Date of Exam: 3/24/2024 10:32 PM EDT    Indication: Abdominal pain, acute, nonlocalized.    Comparison: None available.    Technique: Axial CT images were obtained of the abdomen and pelvis without the administration of contrast. Sagittal and coronal reconstructions were performed.  Automated exposure control and iterative reconstruction methods were used.      Findings:  There is mild dependent bibasilar atelectasis. The distal esophagus is unremarkable. The heart size is normal. There are no acute findings in the superficial soft tissues. There are no acute osseous abnormalities or destructive bone lesions. There are   mild thoracolumbar degenerative changes.    The liver is homogeneous. Patient is status post cholecystectomy. The bile ducts, pancreas, spleen, stomach and adrenal glands appear within normal limits. Kidney parenchyma is homogeneous. No urolithiasis or hydronephrosis. Urinary bladder is   nondistended. Patient is status post hysterectomy. The ovaries are not seen. The appendix is not seen. The colon is unremarkable. Small bowel is nondistended. No ascites, pneumoperitoneum or lymphadenopathy. There is mild aortoiliac atherosclerotic   disease.      Impression:      Impression:  1.No acute abdominal or pelvic abnormality.  2.Status post cholecystectomy and hysterectomy.        Electronically Signed: Dm Nava MD    3/25/2024 5:45 AM EDT    Workstation ID: [id number]          ECG/EMG Results (last 48 hours)       Procedure Component Value Units Date/Time    ECG 12 Lead Chest Pain [[id number]] Collected: 03/24/24 2157     Updated: 03/24/24 2159     QT Interval 439 ms      QTC Interval 539 ms     Narrative:      HEART RATE= 90  bpm  RR Interval= 664  ms  ID Interval= 164  ms  P Horizontal Axis= -17  deg  P Front Axis= 47  deg  QRSD Interval= 84  ms  QT Interval= 439  ms  QTcB= 539  ms  QRS Axis= 69  deg  T  Wave Axis= 146  deg  - ABNORMAL ECG -  Sinus rhythm  Ventricular premature complex  Probable left atrial enlargement  Anterior infarct, age indeterminate  Abnormal T, consider ischemia, lateral leads  Prolonged QT interval  No previous ECG available for comparison  Electronically Signed By:   Date and Time of Study: 2024-03-24 21:57:23          Physician Progress Notes (last 48 hours)  Notes from 03/23/24 1335 through 03/25/24 1335   No notes of this type exist for this encounter.          Consult Notes (last 48 hours)        Fern Elizabeth APRN at 03/25/24 1026        Consult Orders    1. Inpatient Gastroenterology Consult [[id number]] ordered by Yanci Walters APRN at 03/24/24 2205              Attestation signed by Monet Weaver MD at 03/25/24 1241    I have reviewed this documentation and agree.  I, the attending physician, have performed the history taking, physical exam, and medical decision making for this patient.  This is a 59-year-old lady who was admitted as a transfer from [place] where she presented 4-5 times last week due to persistent symptoms.  She complains of left upper quadrant abdominal pain, vomiting, and diarrhea.  Her diarrhea started a week ago and is since resolved.  She did have blood in the stool but that has resolved.  She describes the left upper quadrant abdominal pain as achy, started 2 weeks ago, and is not associated with oral intake or bowel movement.  She has associated shortness of breath.  The pain is not exertional and improved with Zofran.  On exam she is awake and alert no distress with a benign soft nontender nondistended abdomen despite deep palpation.  Labs noted, troponin positive, CBC CMP normal, proBNP 9000.  CT abdomen pelvis noncontrast showed cholecystectomy and hysterectomy.  CT abdomen pelvis 3/16/24 showed possible left-sided colitis and also gastritis and duodenitis.  Follow-up CT 3/20/2024 showed stool in the colon.  Colonoscopy has been obtained  and reviewed from 6/23 which showed stool in the colon and otherwise benign.    1.  Left upper quadrant abdominal pain-may be related to reported history of colitis.  Differential diagnosis includes anginal equivalent.  Recommend cardiology evaluation at some point, timing per cardiology.  Risk actors include hyperlipidemia and smoking.  2.  Bilious emesis-x 2 weeks.  Suspect acute infectious etiology but will plan EGD.  Consider timing of EGD today or tomorrow.  3.  History of diarrhea-resolved  4.  Elevated proBNP  5.  Positive troponin                GI CONSULT  NOTE:    Referring Provider:  Yanci Walters NP    Chief complaint: Abdominal pain, nausea/vomiting    Subjective .     History of present illness: Marietta Alvarez is a 59 y.o. female with history of anxiety/depression, hypertension, hyperlipidemia, opiate abuse on Suboxone, appendectomy, hysterectomy, and cholecystectomy who presents as a transfer from [facility] where she reports 4 ER visits in the past 1 week.  The patient states that she has been having left-sided abdominal pain for the past 2 weeks.  She describes the pain as aching in nature and it only occurs intermittently.  The pain does not seem to be affected by oral intake or bowel movements.  Zofran helps to alleviate the pain somewhat.  She does describe nausea/vomiting following the onset of abdominal pain.  Denies hematemesis or coffee-ground emesis.  States that she had been using NSAIDs in the past,, but denies use since 11/2023.  States that she has been having loose stools 1-2 times daily and has seen some bright red blood per rectum.  No melena.  She does describe some shortness of breath with her abdominal pain.  Denies being around any ill contacts.      Endo History:  Reports previous colonoscopy approximately 5 to 6 months ago by Dr. Kessler in [place].    Past Medical History:  Past Medical History:   Diagnosis Date    Anxiety     Depression     Hyperlipidemia      Hypertension     Osteoporosis        Past Surgical History:  Past Surgical History:   Procedure Laterality Date    APPENDECTOMY      CHOLECYSTECTOMY      HYSTERECTOMY      JOINT REPLACEMENT      NECK SURGERY      TONSILLECTOMY         Social History:  Social History     Tobacco Use    Smoking status: Every Day     Current packs/day: 0.50     Average packs/day: 0.5 packs/day for 44.2 years (22.1 ttl pk-yrs)     Types: Cigarettes     Start date: 1980    Smokeless tobacco: Never   Vaping Use    Vaping status: Never Used   Substance Use Topics    Alcohol use: Never    Drug use: Yes     Comment: On suboxone since November       Family History:  Family History   Problem Relation Age of Onset    COPD Mother     COPD Father     Hypertension Father        Medications:  Medications Prior to Admission   Medication Sig Dispense Refill Last Dose    buprenorphine-naloxone (SUBOXONE) 8-2 MG per SL tablet Place 1 tablet under the tongue 2 (Two) Times a Day.       FLUoxetine (PROzac) 20 MG capsule Take 2 capsules by mouth Daily.       lisinopril (PRINIVIL,ZESTRIL) 20 MG tablet Take 1 tablet by mouth Daily.          Scheduled Meds:buprenorphine-naloxone, 1 tablet, Sublingual, BID  FLUoxetine, 40 mg, Oral, Daily  lisinopril, 20 mg, Oral, Daily  pantoprazole, 40 mg, Intravenous, Q AM  sodium chloride, 10 mL, Intravenous, Q12H      Continuous Infusions:sodium chloride, 100 mL/hr, Last Rate: 100 mL/hr (03/25/24 0832)      PRN Meds:.  acetaminophen **OR** acetaminophen **OR** acetaminophen    senna-docusate sodium **AND** polyethylene glycol **AND** bisacodyl **AND** bisacodyl    nitroglycerin    ondansetron ODT **OR** ondansetron    sodium chloride    sodium chloride    ALLERGIES:  Tape    ROS:  The following systems were reviewed;   Constitution:  No fevers, chills, no unintentional weight loss  Skin: no rash, no jaundice  Eyes:  No blurry vision, no eye pain  HENT:  No change in hearing or smell  Resp:  No cough, dyspnea  CV:  No chest  pain or palpitations  :  No dysuria, hematuria  Musculoskeletal:  No leg cramps or arthralgias  Neuro:  No tremor, no numbness  Psych:  No depression or confusion    Objective     Vital Signs:   Vitals:    03/25/24 0148 03/25/24 0400 03/25/24 0515 03/25/24 0900   BP: 117/83  103/63 141/95   BP Location: Left arm  Left arm Left arm   Patient Position: Lying  Lying Lying   Pulse: 88 80 83 86   Resp: 17  14 15   Temp: 97.9 °F (36.6 °C)  98 °F (36.7 °C) 97.6 °F (36.4 °C)   TempSrc: Oral  Oral Axillary   SpO2: 94% (!) 89% 91% 94%   Weight:   80.5 kg (177 lb 7.5 oz)    Height:           Physical Exam:       General Appearance:    Awake and alert, in no acute distress   Head:    Normocephalic, without obvious abnormality, atraumatic   Throat:   No oral lesions, no thrush, oral mucosa moist   Lungs:     Respirations regular, even and unlabored   Chest Wall:    No abnormalities observed   Abdomen:     Soft, LUQ tenderness, no rebound or guarding, non-distended   Rectal:     Deferred   Extremities:   Moves all extremities, no edema, no cyanosis   Pulses:   Pulses palpable and equal bilaterally   Skin:   No rash, no jaundice, normal palpation   Lymph nodes:   No cervical, supraclavicular or submandibular palpable adenopathy   Neurologic:   Cranial nerves 2 - 12 grossly intact, no asterixis       Results Review:   I reviewed the patient's labs and imaging.  CBC    Results from last 7 days   Lab Units 03/25/24  0912 03/24/24  2156   WBC 10*3/mm3 6.07 8.49   HEMOGLOBIN g/dL 12.6 13.5   PLATELETS 10*3/mm3 298 322     CMP   Results from last 7 days   Lab Units 03/25/24  0912 03/24/24  2156   SODIUM mmol/L 139 140   POTASSIUM mmol/L 3.5 3.6   CHLORIDE mmol/L 104 106   CO2 mmol/L 24.0 22.0   BUN mg/dL 6 7   CREATININE mg/dL 0.53* 0.59   GLUCOSE mg/dL 82 90   ALBUMIN g/dL  --  3.4*   BILIRUBIN mg/dL  --  0.3   ALK PHOS U/L  --  90   AST (SGOT) U/L  --  22   ALT (SGPT) U/L  --  11   MAGNESIUM mg/dL 2.1  --    PHOSPHORUS mg/dL 3.5  --  "     Cr Clearance Estimated Creatinine Clearance: 124.9 mL/min (A) (by C-G formula based on SCr of 0.53 mg/dL (L)).  Coag     HbA1C No results found for: \"HGBA1C\"  Blood Glucose No results found for: \"POCGLU\"  Infection     UA      Imaging Results (Last 72 Hours)       Procedure Component Value Units Date/Time    XR Outside Films [[id number]] Resulted: 03/25/24 0645     Updated: 03/25/24 0645    Narrative:      This procedure was auto-finalized with no dictation required.    XR Outside Films [[id number]] Resulted: 03/25/24 0644     Updated: 03/25/24 0644    Narrative:      This procedure was auto-finalized with no dictation required.    CT Abdomen Pelvis Without Contrast [[id number]] Collected: 03/24/24 2252     Updated: 03/25/24 0547    Narrative:      CT ABDOMEN PELVIS WO CONTRAST    Date of Exam: 3/24/2024 10:32 PM EDT    Indication: Abdominal pain, acute, nonlocalized.    Comparison: None available.    Technique: Axial CT images were obtained of the abdomen and pelvis without the administration of contrast. Sagittal and coronal reconstructions were performed.  Automated exposure control and iterative reconstruction methods were used.      Findings:  There is mild dependent bibasilar atelectasis. The distal esophagus is unremarkable. The heart size is normal. There are no acute findings in the superficial soft tissues. There are no acute osseous abnormalities or destructive bone lesions. There are   mild thoracolumbar degenerative changes.    The liver is homogeneous. Patient is status post cholecystectomy. The bile ducts, pancreas, spleen, stomach and adrenal glands appear within normal limits. Kidney parenchyma is homogeneous. No urolithiasis or hydronephrosis. Urinary bladder is   nondistended. Patient is status post hysterectomy. The ovaries are not seen. The appendix is not seen. The colon is unremarkable. Small bowel is nondistended. No ascites, pneumoperitoneum or lymphadenopathy. There is mild aortoiliac " atherosclerotic   disease.      Impression:      Impression:  1.No acute abdominal or pelvic abnormality.  2.Status post cholecystectomy and hysterectomy.        Electronically Signed: Dm Nava MD    3/25/2024 5:45 AM EDT    Workstation ID: [id number]            ASSESSMENT:  -Left-sided abdominal pain  -Nausea/vomiting  -Diarrhea  -Chest pain/elevated troponin  -Anxiety/depression  -Hypertension  -Hyperlipidemia  -History of opiate addiction on Suboxone  -History of appendectomy  -History of hysterectomy  -History of cholecystectomy    PLAN:  Patient is a 59-year-old female with history of hypertension, hyperlipidemia, and cholecystectomy who presented as a transfer from [facility] with complaints of chest pain, abdominal pain, and nausea/vomiting.  [facility].    CT abdomen/pelvis WO on admission shows no acute abnormality.  [facility] and recent colonoscopy operative report as she reports that this was done 5 to 6 months ago.  Could consider possible EGD tomorrow.  Await cardiology evaluation.  Will check stool studies for infection.  Also check fecal calprotectin.  Antiemetics/analgesics as needed.  Continue PPI.  CBC and CMP unremarkable.  Supportive care.      I discussed the patients findings and my recommendations with the patient.  I will discuss the case with Dr. Weaver and change plan accordingly.    We appreciate the referral.    Electronically signed by NISHI Arboleda, 03/25/24, 10:27 AM EDT.                  Electronically signed by Monet Weaver MD at 03/25/24 0443

## 2024-03-25 NOTE — H&P
[facility] Medicine Services  History & Physical    Patient Name: Marietta Alvarez  : 1965  MRN: [id number]  Primary Care Physician:  Lovely Jarrett MD  Date of admission: 3/24/2024  Date and Time of Service: 3/24/2024 at 2120    Subjective      Chief Complaint: chest pain     History of Present Illness: Marietta Alvarez is a 59 y.o. female with a CMH of anxiety, depression, insomnia, chronic neck pain, hyperlipidemia, hypertension, tobacco abuse who presented to [facility] on 3/24/2024 on transfer from [facility] emergency department where she reports she presented with complaints of abdominal pain, nausea and vomiting.  She also reports she had some rectal bleeding.  She reports she has been to the emergency department there 4 times over the past week and has had CTs done and was told it was her hemorrhoids that were bleeding and she had colitis.  She reports she was given antibiotics which she has finished.  She reports while she was in the ambulance she developed left-sided chest pain and shortness of air.  She denied any dizziness.  She denies any chest pain now.  Documents from [facility] did not include any CT abdomen and did not include any information as to why patient presented.  She reports she saw a cardiologist years ago for an abnormal rhythm but has never had a cardiac evaluation.  Labs from [facility] showed a troponin of 145 then 148, potassium 3.2 glucose 137 EKG from [facility] shows sinus tachycardia heart rate 102 T wave abnormality lateral leads, chest x-ray per radiology showed no active disease.  Aspirin was given at [facility] as well as a 1 L fluid bolus and Haldol.  Repeat EKG, high-sensitivity troponin, CMP CBC and CT abdomen have been ordered and pending.  Cardiology was consulted from [facility].  Gastroenterology has been consulted.    Review of Systems   Constitutional: Negative.    HENT: Negative.      Eyes: Negative.    Respiratory:  Positive for shortness of breath.    Cardiovascular:  Positive for chest pain.   Gastrointestinal:  Positive for abdominal pain, anal bleeding, nausea and vomiting.   Endocrine: Negative.    Genitourinary: Negative.    Musculoskeletal: Negative.    Skin: Negative.    Allergic/Immunologic: Negative.    Neurological: Negative.    Hematological: Negative.    Psychiatric/Behavioral: Negative.     All other systems reviewed and are negative.      Personal History     Past Medical History:   Diagnosis Date    Anxiety     Depression     Hyperlipidemia     Hypertension        Past Surgical History:   Procedure Laterality Date    APPENDECTOMY      CHOLECYSTECTOMY      HYSTERECTOMY      JOINT REPLACEMENT      NECK SURGERY      TONSILLECTOMY         Family History: family history includes COPD in her father and mother; Hypertension in her father. Otherwise pertinent FHx was reviewed and not pertinent to current issue.    Social History:  reports that she has been smoking cigarettes. She started smoking about 44 years ago. She has a 22.1 pack-year smoking history. She has never used smokeless tobacco. She reports current drug use. She reports that she does not drink alcohol.    Home Medications:  Prior to Admission Medications       Prescriptions Last Dose Informant Patient Reported? Taking?    buprenorphine-naloxone (SUBOXONE) 8-2 MG per SL tablet  Self Yes No    Place 1 tablet under the tongue 2 (Two) Times a Day.    FLUoxetine (PROzac) 20 MG capsule  Self Yes No    Take 2 capsules by mouth Daily.    lisinopril (PRINIVIL,ZESTRIL) 20 MG tablet  Self Yes No    Take 1 tablet by mouth Daily.              Allergies:  Allergies   Allergen Reactions    Tape Itching       Objective      Vitals:   Temp:  [99.3 °F (37.4 °C)] 99.3 °F (37.4 °C)  Heart Rate:  [92] 92  Resp:  [18] 18  BP: (143)/(97) 143/97  Body mass index is 28.87 kg/m².  Physical Exam  Vitals reviewed.   Constitutional:        Appearance: Normal appearance. She is obese.   HENT:      Head: Normocephalic and atraumatic.      Right Ear: External ear normal.      Left Ear: External ear normal.      Nose: Nose normal.      Mouth/Throat:      Mouth: Mucous membranes are moist.   Eyes:      Extraocular Movements: Extraocular movements intact.   Cardiovascular:      Rate and Rhythm: Normal rate and regular rhythm.      Heart sounds: Normal heart sounds.   Pulmonary:      Effort: Pulmonary effort is normal.      Breath sounds: Normal breath sounds.   Abdominal:      General: Bowel sounds are normal.      Palpations: Abdomen is soft.   Genitourinary:     Rectum: Normal.   Musculoskeletal:         General: Normal range of motion.      Cervical back: Normal range of motion and neck supple.   Skin:     General: Skin is warm and dry.   Neurological:      General: No focal deficit present.      Mental Status: She is alert and oriented to person, place, and time.   Psychiatric:         Mood and Affect: Mood normal.         Behavior: Behavior normal.         Thought Content: Thought content normal.         Judgment: Judgment normal.         Diagnostic Data:  Lab Results (last 24 hours)       Procedure Component Value Units Date/Time    CBC & Differential [[id number]]  (Abnormal) Collected: 03/24/24 2156    Specimen: Blood from Arm, Left Updated: 03/24/24 2209    Narrative:      The following orders were created for panel order CBC & Differential.  Procedure                               Abnormality         Status                     ---------                               -----------         ------                     CBC Auto Differential[[id number]]        Abnormal            Final result                 Please view results for these tests on the individual orders.    CBC Auto Differential [[id number]]  (Abnormal) Collected: 03/24/24 2156    Specimen: Blood from Arm, Left Updated: 03/24/24 2209     WBC 8.49 10*3/mm3      RBC 4.53 10*6/mm3      Hemoglobin  13.5 g/dL      Hematocrit 44.0 %      MCV 97.1 fL      MCH 29.8 pg      MCHC 30.7 g/dL      RDW 14.4 %      RDW-SD 51.8 fl      MPV 9.8 fL      Platelets 322 10*3/mm3      Neutrophil % 66.6 %      Lymphocyte % 22.6 %      Monocyte % 9.5 %      Eosinophil % 0.4 %      Basophil % 0.5 %      Immature Grans % 0.4 %      Neutrophils, Absolute 5.66 10*3/mm3      Lymphocytes, Absolute 1.92 10*3/mm3      Monocytes, Absolute 0.81 10*3/mm3      Eosinophils, Absolute 0.03 10*3/mm3      Basophils, Absolute 0.04 10*3/mm3      Immature Grans, Absolute 0.03 10*3/mm3      nRBC 0.0 /100 WBC     High Sensitivity Troponin T [[id number]] Collected: 03/24/24 2156    Specimen: Blood from Arm, Left Updated: 03/24/24 2202    Comprehensive Metabolic Panel [[id number]] Collected: 03/24/24 2156    Specimen: Blood from Arm, Left Updated: 03/24/24 2202    BNP [[id number]] Collected: 03/24/24 2156    Specimen: Blood from Arm, Left Updated: 03/24/24 2202             Imaging Results (Last 24 Hours)       ** No results found for the last 24 hours. **              Assessment & Plan        This is a 59 y.o. female with:    Active and Resolved Problems  Active Hospital Problems    Diagnosis  POA    **NSTEMI (non-ST elevated myocardial infarction) [I21.4]  Yes     Priority: High    Abdominal pain [R10.9]  Yes     Priority: Medium    Rectal bleeding [K62.5]  Yes     Priority: Medium    Depression [F32.A]  Yes    Anxiety disorder [F41.9]  Yes    Chronic pain [G89.29]  Yes    Essential hypertension [I10]  Yes      Resolved Hospital Problems   No resolved problems to display.     Non-STEMI with chest pain and shortness of air now resolved without intervention [facility] 145 [facility] repeat troponin and EKG ordered here and pending, cardiology consulted from outlying facility, continuous cardiac monitoring aspirin at outlying facility, no Lovenox or heparin given rectal bleeding, 2D echo in a.m.    Abdominal pain, patient reports she has  been dealing with colitis finished antibiotics WBC not elevated per outlying facility CBC pending, gastroenterology consulted, 4 mg IV Zofran every 6 hours as needed nausea    Rectal bleeding, patient reports she was told it was from hemorrhoids, H&H stable per outlying facility CBC pending gastroenterology consulted    Depression/anxiety, on home Prozac reordered    Chronic pain, on home Suboxone verified by sidebar summary recent prescription reordered    Essential hypertension, reordered home lisinopril, monitor BP    DVT prophylaxis:  Mechanical DVT prophylaxis orders are present.        The patient desires to be as follows:    CODE STATUS:    Code Status (Patient has no pulse and is not breathing): CPR (Attempt to Resuscitate)  Medical Interventions (Patient has pulse or is breathing): Full Support          Admission Status:  I believe this patient meets inpatient  status.    Expected Length of Stay: pending clinical course     PDMP and Medication Dispenses via Sidebar reviewed and consistent with patient reported medications.    I discussed the patient's findings and my recommendations with patient.      Signature:     This document has been electronically signed by NISHI Issa on March 24, 2024 22:12 EDT   [facility], [place], operated by [facility] Hospitalist Team

## 2024-03-25 NOTE — CASE MANAGEMENT/SOCIAL WORK
Discharge Planning Assessment   Skip     Patient Name: Marietta Alvarez  MRN: [id number]  Today's Date: 3/25/2024    Admit Date: 3/24/2024    Plan: Routine home with ex- Satish   Discharge Needs Assessment       Row Name 03/25/24 1453       Living Environment    People in Home other (see comments)    Name(s) of People in Home Satish, ex-    Current Living Arrangements home    Potentially Unsafe Housing Conditions none    In the past 12 months has the electric, gas, oil, or water company threatened to shut off services in your home? No    Primary Care Provided by self    Provides Primary Care For no one    Family Caregiver if Needed spouse    Quality of Family Relationships helpful;involved;supportive    Able to Return to Prior Arrangements yes       Resource/Environmental Concerns    Resource/Environmental Concerns none    Transportation Concerns none       Transportation Needs    In the past 12 months, has lack of transportation kept you from medical appointments or from getting medications? no    In the past 12 months, has lack of transportation kept you from meetings, work, or from getting things needed for daily living? No       Food Insecurity    Within the past 12 months, you worried that your food would run out before you got the money to buy more. Never true    Within the past 12 months, the food you bought just didn't last and you didn't have money to get more. Never true       Transition Planning    Patient/Family Anticipates Transition to home with family    Patient/Family Anticipated Services at Transition none    Transportation Anticipated family or friend will provide       Discharge Needs Assessment    Readmission Within the Last 30 Days no previous admission in last 30 days    Equipment Currently Used at Home none    Concerns to be Addressed no discharge needs identified    Anticipated Changes Related to Illness none    Equipment Needed After Discharge none              Discharge  Plan       Row Name 03/25/24 1455       Plan    Plan Routine home with ex- Satish    Patient/Family in Agreement with Plan yes    Plan Comments CM met with patient at bedside. Confirmed PCP and pharmacy, enrolled in [facility]. IADLs, no DMEs. Pt does not have HH/PT services and denies needing them. Pt lives at home with ex-, who will provide transportation at d/c. Pt denies needing assistance affording food or medication. SDOH completed. DC Barriers: NPO, Cardio consult, IVF.             Expected Discharge Date and Time       Expected Discharge Date Expected Discharge Time    Mar 26, 2024            Demographic Summary       Row Name 03/25/24 1452       General Information    Admission Type inpatient    Arrived From emergency department    Referral Source admission list    Reason for Consult discharge planning    Preferred Language English              Functional Status       Row Name 03/25/24 1453       Functional Status    Usual Activity Tolerance good    Current Activity Tolerance good       Functional Status, IADL    Medications independent    Meal Preparation independent    Housekeeping independent    Laundry independent    Shopping independent       Mental Status    General Appearance WDL WDL       Mental Status Summary    Recent Changes in Mental Status/Cognitive Functioning no changes           Sania Suarez RN    phone [phone]  fax [phone]

## 2024-03-25 NOTE — CONSULTS
GI CONSULT  NOTE:    Referring Provider:  Yanci Walters NP    Chief complaint: Abdominal pain, nausea/vomiting    Subjective .     History of present illness: Marietta Alvarez is a 59 y.o. female with history of anxiety/depression, hypertension, hyperlipidemia, opiate abuse on Suboxone, appendectomy, hysterectomy, and cholecystectomy who presents as a transfer from [facility] where she reports 4 ER visits in the past 1 week.  The patient states that she has been having left-sided abdominal pain for the past 2 weeks.  She describes the pain as aching in nature and it only occurs intermittently.  The pain does not seem to be affected by oral intake or bowel movements.  Zofran helps to alleviate the pain somewhat.  She does describe nausea/vomiting following the onset of abdominal pain.  Denies hematemesis or coffee-ground emesis.  States that she had been using NSAIDs in the past,, but denies use since 11/2023.  States that she has been having loose stools 1-2 times daily and has seen some bright red blood per rectum.  No melena.  She does describe some shortness of breath with her abdominal pain.  Denies being around any ill contacts.      Endo History:  Reports previous colonoscopy approximately 5 to 6 months ago by Dr. Kessler in [place].    Past Medical History:  Past Medical History:   Diagnosis Date    Anxiety     Depression     Hyperlipidemia     Hypertension     Osteoporosis        Past Surgical History:  Past Surgical History:   Procedure Laterality Date    APPENDECTOMY      CHOLECYSTECTOMY      HYSTERECTOMY      JOINT REPLACEMENT      NECK SURGERY      TONSILLECTOMY         Social History:  Social History     Tobacco Use    Smoking status: Every Day     Current packs/day: 0.50     Average packs/day: 0.5 packs/day for 44.2 years (22.1 ttl pk-yrs)     Types: Cigarettes     Start date: 1980    Smokeless tobacco: Never   Vaping Use    Vaping status: Never Used   Substance Use Topics    Alcohol  use: Never    Drug use: Yes     Comment: On suboxone since November       Family History:  Family History   Problem Relation Age of Onset    COPD Mother     COPD Father     Hypertension Father        Medications:  Medications Prior to Admission   Medication Sig Dispense Refill Last Dose    buprenorphine-naloxone (SUBOXONE) 8-2 MG per SL tablet Place 1 tablet under the tongue 2 (Two) Times a Day.       FLUoxetine (PROzac) 20 MG capsule Take 2 capsules by mouth Daily.       lisinopril (PRINIVIL,ZESTRIL) 20 MG tablet Take 1 tablet by mouth Daily.          Scheduled Meds:buprenorphine-naloxone, 1 tablet, Sublingual, BID  FLUoxetine, 40 mg, Oral, Daily  lisinopril, 20 mg, Oral, Daily  pantoprazole, 40 mg, Intravenous, Q AM  sodium chloride, 10 mL, Intravenous, Q12H      Continuous Infusions:sodium chloride, 100 mL/hr, Last Rate: 100 mL/hr (03/25/24 0832)      PRN Meds:.  acetaminophen **OR** acetaminophen **OR** acetaminophen    senna-docusate sodium **AND** polyethylene glycol **AND** bisacodyl **AND** bisacodyl    nitroglycerin    ondansetron ODT **OR** ondansetron    sodium chloride    sodium chloride    ALLERGIES:  Tape    ROS:  The following systems were reviewed;   Constitution:  No fevers, chills, no unintentional weight loss  Skin: no rash, no jaundice  Eyes:  No blurry vision, no eye pain  HENT:  No change in hearing or smell  Resp:  No cough, dyspnea  CV:  No chest pain or palpitations  :  No dysuria, hematuria  Musculoskeletal:  No leg cramps or arthralgias  Neuro:  No tremor, no numbness  Psych:  No depression or confusion    Objective     Vital Signs:   Vitals:    03/25/24 0148 03/25/24 0400 03/25/24 0515 03/25/24 0900   BP: 117/83  103/63 141/95   BP Location: Left arm  Left arm Left arm   Patient Position: Lying  Lying Lying   Pulse: 88 80 83 86   Resp: 17  14 15   Temp: 97.9 °F (36.6 °C)  98 °F (36.7 °C) 97.6 °F (36.4 °C)   TempSrc: Oral  Oral Axillary   SpO2: 94% (!) 89% 91% 94%   Weight:   80.5 kg  "(177 lb 7.5 oz)    Height:           Physical Exam:       General Appearance:    Awake and alert, in no acute distress   Head:    Normocephalic, without obvious abnormality, atraumatic   Throat:   No oral lesions, no thrush, oral mucosa moist   Lungs:     Respirations regular, even and unlabored   Chest Wall:    No abnormalities observed   Abdomen:     Soft, LUQ tenderness, no rebound or guarding, non-distended   Rectal:     Deferred   Extremities:   Moves all extremities, no edema, no cyanosis   Pulses:   Pulses palpable and equal bilaterally   Skin:   No rash, no jaundice, normal palpation   Lymph nodes:   No cervical, supraclavicular or submandibular palpable adenopathy   Neurologic:   Cranial nerves 2 - 12 grossly intact, no asterixis       Results Review:   I reviewed the patient's labs and imaging.  CBC    Results from last 7 days   Lab Units 03/25/24  0912 03/24/24  2156   WBC 10*3/mm3 6.07 8.49   HEMOGLOBIN g/dL 12.6 13.5   PLATELETS 10*3/mm3 298 322     CMP   Results from last 7 days   Lab Units 03/25/24  0912 03/24/24  2156   SODIUM mmol/L 139 140   POTASSIUM mmol/L 3.5 3.6   CHLORIDE mmol/L 104 106   CO2 mmol/L 24.0 22.0   BUN mg/dL 6 7   CREATININE mg/dL 0.53* 0.59   GLUCOSE mg/dL 82 90   ALBUMIN g/dL  --  3.4*   BILIRUBIN mg/dL  --  0.3   ALK PHOS U/L  --  90   AST (SGOT) U/L  --  22   ALT (SGPT) U/L  --  11   MAGNESIUM mg/dL 2.1  --    PHOSPHORUS mg/dL 3.5  --      Cr Clearance Estimated Creatinine Clearance: 124.9 mL/min (A) (by C-G formula based on SCr of 0.53 mg/dL (L)).  Coag     HbA1C No results found for: \"HGBA1C\"  Blood Glucose No results found for: \"POCGLU\"  Infection     UA      Imaging Results (Last 72 Hours)       Procedure Component Value Units Date/Time    XR Outside Films [[id number]] Resulted: 03/25/24 0645     Updated: 03/25/24 0645    Narrative:      This procedure was auto-finalized with no dictation required.    XR Outside Films [[id number]] Resulted: 03/25/24 0644     Updated: " 03/25/24 0644    Narrative:      This procedure was auto-finalized with no dictation required.    CT Abdomen Pelvis Without Contrast [[id number]] Collected: 03/24/24 2252     Updated: 03/25/24 0547    Narrative:      CT ABDOMEN PELVIS WO CONTRAST    Date of Exam: 3/24/2024 10:32 PM EDT    Indication: Abdominal pain, acute, nonlocalized.    Comparison: None available.    Technique: Axial CT images were obtained of the abdomen and pelvis without the administration of contrast. Sagittal and coronal reconstructions were performed.  Automated exposure control and iterative reconstruction methods were used.      Findings:  There is mild dependent bibasilar atelectasis. The distal esophagus is unremarkable. The heart size is normal. There are no acute findings in the superficial soft tissues. There are no acute osseous abnormalities or destructive bone lesions. There are   mild thoracolumbar degenerative changes.    The liver is homogeneous. Patient is status post cholecystectomy. The bile ducts, pancreas, spleen, stomach and adrenal glands appear within normal limits. Kidney parenchyma is homogeneous. No urolithiasis or hydronephrosis. Urinary bladder is   nondistended. Patient is status post hysterectomy. The ovaries are not seen. The appendix is not seen. The colon is unremarkable. Small bowel is nondistended. No ascites, pneumoperitoneum or lymphadenopathy. There is mild aortoiliac atherosclerotic   disease.      Impression:      Impression:  1.No acute abdominal or pelvic abnormality.  2.Status post cholecystectomy and hysterectomy.        Electronically Signed: Dm Nava MD    3/25/2024 5:45 AM EDT    Workstation ID: [id number]            ASSESSMENT:  -Left-sided abdominal pain  -Nausea/vomiting  -Diarrhea  -Chest pain/elevated troponin  -Anxiety/depression  -Hypertension  -Hyperlipidemia  -History of opiate addiction on Suboxone  -History of appendectomy  -History of hysterectomy  -History of  cholecystectomy    PLAN:  Patient is a 59-year-old female with history of hypertension, hyperlipidemia, and cholecystectomy who presented as a transfer from [facility] with complaints of chest pain, abdominal pain, and nausea/vomiting.  Patient reports CT at outlying facility that showed colitis.    CT abdomen/pelvis WO on admission shows no acute abnormality.  We will obtain CT report from outlying facility and recent colonoscopy operative report as she reports that this was done 5 to 6 months ago.  Could consider possible EGD tomorrow.  Await cardiology evaluation.  Will check stool studies for infection.  Also check fecal calprotectin.  Antiemetics/analgesics as needed.  Continue PPI.  CBC and CMP unremarkable.  Supportive care.      I discussed the patients findings and my recommendations with the patient.  I will discuss the case with Dr. Weaver and change plan accordingly.    We appreciate the referral.    Electronically signed by NISHI Arboleda, 03/25/24, 10:27 AM EDT.

## 2024-03-25 NOTE — ANESTHESIA POSTPROCEDURE EVALUATION
Patient: Marietta Alvarez    Procedure Summary       Date: 03/25/24 Room / Location: [facility] 1 / [facility]    Anesthesia Start: 1409 Anesthesia Stop: 1430    Procedure: ESOPHAGOGASTRODUODENOSCOPY WITH BIOPSY X2 AREAS Diagnosis:       Nausea and vomiting, unspecified vomiting type      Abnormal CT of the abdomen      (Nausea and vomiting, unspecified vomiting type [R11.2])      (Abnormal CT of the abdomen [R93.5])    Surgeons: Monet Weaver MD Provider: Jann Doyle MD    Anesthesia Type: general, MAC ASA Status: 3            Anesthesia Type: general, MAC    Vitals  Vitals Value Taken Time   /81 03/25/24 1501   Temp     Pulse 80 03/25/24 1501   Resp 14 03/25/24 1501   SpO2 92 % 03/25/24 1501           Post Anesthesia Care and Evaluation    Patient location during evaluation: PACU  Patient participation: complete - patient participated  Level of consciousness: awake  Pain scale: See nurse's notes for pain score.  Pain management: adequate    Airway patency: patent  Anesthetic complications: No anesthetic complications  PONV Status: none  Cardiovascular status: acceptable  Respiratory status: acceptable and spontaneous ventilation  Hydration status: acceptable    Comments: Patient seen and examined postoperatively; vital signs stable; SpO2 greater than or equal to 90%; cardiopulmonary status stable; nausea/vomiting adequately controlled; pain adequately controlled; no apparent anesthesia complications; patient discharged from anesthesia care when discharge criteria were met

## 2024-03-25 NOTE — DISCHARGE SUMMARY
[facility] Medicine Services  Discharge Summary    Date of Service: 24    Patient Name: Marietta Alvarez  : 1965  MRN: [id number]    Date of Admission: 3/24/2024  Discharge Diagnosis: Gasteritis , chest pain ACS ruled out  Date of Discharge:  24    Primary Care Physician: Lovely Jarrett MD      Presenting Problem:   NSTEMI (non-ST elevated myocardial infarction) [I21.4]    Active and Resolved Hospital Problems:  Elevated troponing  Abd pain   Nausea and vomiting  Abdominal pain  #Rectal bleeding- resolved    Essential hypertension  #History of depression  #History of anxiety  #Chronic pain syndrome       Hospital Course     HPI:  Per the H&P  see HPI    Hospital Course:  Pt presented with abd pain and rectal bleeding. H/H remained stable .GI team was consulted pt had EGD dibe which showed gastritis . Pt was started on PPI IV and d/c with PO and advised to f/u biopsy result as out pt.  Cardiology team was consulted and endorsed pt pain unlikley from ACS; troponin's remained flat and had no chest pain during hospital stay.        DISCHARGE Follow Up Recommendations for labs and diagnostics:   Gi team for biopsy result       Reasons For Change In Medications and Indications for New Medications:      Day of Discharge     Vital Signs:  Temp:  [97.6 °F (36.4 °C)-99.3 °F (37.4 °C)] 99 °F (37.2 °C)  Heart Rate:  [77-98] 83  Resp:  [12-19] 13  BP: (103-143)/(63-97) 134/81  Flow (L/min):  [10] 10    Physical Exam:  Physical Exam  HENT:      Head: Normocephalic.      Mouth/Throat:      Mouth: Mucous membranes are moist.   Eyes:      Pupils: Pupils are equal, round, and reactive to light.   Cardiovascular:      Rate and Rhythm: Normal rate and regular rhythm.   Pulmonary:      Effort: Pulmonary effort is normal.      Breath sounds: Normal breath sounds.   Abdominal:      General: Bowel sounds are normal.      Palpations: Abdomen is soft.   Musculoskeletal:         General: Normal  range of motion.      Cervical back: Neck supple.   Skin:     General: Skin is warm.   Neurological:      General: No focal deficit present.      Mental Status: She is alert and oriented to person, place, and time.   Psychiatric:         Mood and Affect: Mood normal.              Pertinent  and/or Most Recent Results     LAB RESULTS:      Lab 03/25/24  0912 03/24/24 2156   WBC 6.07 8.49   HEMOGLOBIN 12.6 13.5   HEMATOCRIT 41.0 44.0   PLATELETS 298 322   NEUTROS ABS 3.50 5.66   IMMATURE GRANS (ABS) 0.02 0.03   LYMPHS ABS 1.80 1.92   MONOS ABS 0.66 0.81   EOS ABS 0.07 0.03   MCV 96.5 97.1*         Lab 03/25/24  0912 03/24/24 2156   SODIUM 139 140   POTASSIUM 3.5 3.6   CHLORIDE 104 106   CO2 24.0 22.0   ANION GAP 11.0 12.0   BUN 6 7   CREATININE 0.53* 0.59   EGFR 106.7 104.0   GLUCOSE 82 90   CALCIUM 8.2* 8.8   MAGNESIUM 2.1  --    PHOSPHORUS 3.5  --          Lab 03/24/24 2156   TOTAL PROTEIN 6.0   ALBUMIN 3.4*   GLOBULIN 2.6   ALT (SGPT) 11   AST (SGOT) 22   BILIRUBIN 0.3   ALK PHOS 90         Lab 03/25/24  0001 03/24/24 2156   PROBNP  --  9,110.0*   HSTROP T 25* 26*                 Brief Urine Lab Results       None          Microbiology Results (last 10 days)       ** No results found for the last 240 hours. **            CT Abdomen Pelvis Without Contrast    Result Date: 3/25/2024  Impression: Impression: 1.No acute abdominal or pelvic abnormality. 2.Status post cholecystectomy and hysterectomy. Electronically Signed: Dm Nava MD  3/25/2024 5:45 AM EDT  Workstation ID: [id number]                 Labs Pending at Discharge:  Pending Labs       Order Current Status    Tissue Pathology Exam Collected (03/25/24 0874)            Procedures Performed  Procedure(s):  ESOPHAGOGASTRODUODENOSCOPY WITH BIOPSY X2 AREAS  03/25 1403 Upper GI Endoscopy      Consults:   Consults       Date and Time Order Name Status Description    3/24/2024 10:05 PM Inpatient Gastroenterology Consult Completed     3/24/2024  9:15 PM  Inpatient Cardiology Consult                Discharge Details        Discharge Medications        New Medications        Instructions Start Date   acetaminophen 325 MG tablet  Commonly known as: TYLENOL   650 mg, Oral, Every 4 Hours PRN      ondansetron ODT 4 MG disintegrating tablet  Commonly known as: ZOFRAN-ODT   4 mg, Translingual, Every 8 Hours PRN      pantoprazole 40 MG EC tablet  Commonly known as: Protonix   40 mg, Oral, Daily             Continue These Medications        Instructions Start Date   buprenorphine-naloxone 8-2 MG per SL tablet  Commonly known as: SUBOXONE   1 tablet, Sublingual, 2 Times Daily      FLUoxetine 40 MG capsule  Commonly known as: PROzac   40 mg, Oral, Daily      lisinopril 20 MG tablet  Commonly known as: PRINIVIL,ZESTRIL   20 mg, Oral, Daily      promethazine 12.5 MG tablet  Commonly known as: PHENERGAN   12.5 mg, Oral, Every 6 Hours PRN      Teriparatide 600 MCG/2.4ML solution pen-injector   20 mcg, Subcutaneous, Daily               Allergies   Allergen Reactions    Tape Itching         Discharge Disposition:   Home or Self Care    Diet:  Hospital:  Diet Order   Procedures    Diet: Cardiac; Healthy Heart (2-3 Na+); Fluid Consistency: Thin (IDDSI 0)         Discharge Activity:   as tolerated       CODE STATUS:  Code Status and Medical Interventions:   Ordered at: 03/24/24 2113     Code Status (Patient has no pulse and is not breathing):    CPR (Attempt to Resuscitate)     Medical Interventions (Patient has pulse or is breathing):    Full Support         No future appointments.        Time spent on Discharge including face to face service:  >30 minutes    Signature: Electronically signed by Regan Porter MD, 03/25/24, 18:01 EDT.  [facility] Hospitalist Team

## 2024-03-25 NOTE — OP NOTE
ESOPHAGOGASTRODUODENOSCOPY Procedure Report    Patient Name:  Marietta Alvarez  YOB: 1965    Date of Surgery:  3/25/2024     Pre-Op Diagnosis:  Nausea and vomiting, unspecified vomiting type [R11.2]  Abnormal CT of the abdomen [R93.5]  LUQ pain       Post Op Diagnosis:  Gastritis      Procedure/CPT® Codes:      Procedure(s):  ESOPHAGOGASTRODUODENOSCOPY WITH BIOPSY X2 AREAS    Staff:  Surgeon(s):  Monet Weaver MD         Anesthesia: Monitored Anesthesia Care    Implants:    Nothing was implanted during the procedure    Specimen:        See Below    No blood loss    Complications:  None     Description of Procedure:  Informed consent was obtained for the procedure, including sedation.  Risks of perforation, hemorrhage, adverse drug reaction and aspiration were discussed.  The patient was brought into the endoscopy suite. Continuous cardiopulmonary monitoring was performed. The patient was placed in the left lateral decubitus position.  The bite block was inserted into the patient's mouth. After adequate sedation was attained, the Olympus gastroscope was inserted into the patient's mouth and advanced to the second portion of the duodenum without difficulty.  Circumferential examination was performed. A retroflex exam was performed in the patient's stomach.  On completion of the exam, the bowel was decompressed, the scope was removed from the patient, the patient tolerated the procedure well, there were no immediate post-operative complications.     Examination of the esophagus showed normal mucosa  Examination of the stomach showed diffuse non-erosive gastritis.  Biopsies were obtained from the gastric antrum and body and sent for pathology evaluate H. pylori  Retroflex examination of the stomach was normal   Examination of the duodenum showed normal mucosa.  Biopsies were obtained from the second portion of duodenum and the duodenal bulb and sent for pathology to evaluate for celiac  disease      Impression:  Gastritis      Recommendations:  Follow-up histopathology  Okay for diet as tolerated  Antiemetics as needed  GI will see inpatient as needed      Monet Weaver MD     Date: 3/25/2024  Time: 14:30 EDT

## 2024-03-25 NOTE — PLAN OF CARE
Goal Outcome Evaluation:            Goals of care met, patient discharging home with significant other. IV sites discontinued. Discharge instructions read and understood. Education and follow-up appointments reviewed with patient. W/C transport to [facility].

## 2024-03-25 NOTE — PLAN OF CARE
Goal Outcome Evaluation:         Pleasant and cooperative with care. EGD performed, biopsies taken. Diet advanced as tolerated. Family at bedside. VSS. Continuing to monitor.

## 2024-03-25 NOTE — ANESTHESIA PREPROCEDURE EVALUATION
Anesthesia Evaluation     Patient summary reviewed and Nursing notes reviewed   NPO Solid Status: > 8 hours  NPO Liquid Status: > 2 hours           Airway   Mallampati: II  Dental    (+) poor dentition        Pulmonary    Cardiovascular     ECG reviewed    (+) hypertension, past MI , hyperlipidemia      Neuro/Psych  GI/Hepatic/Renal/Endo    (+) GI bleeding upper active bleeding    Musculoskeletal     Abdominal    Substance History      OB/GYN          Other        ROS/Med Hx Other: Very poor dentition due to advanced tooth decay, most broken.  Discussed high risk dental injury secondary tooth fragility.  Voices understanding.              Anesthesia Plan    ASA 3     general and MAC     intravenous induction     Anesthetic plan, risks, benefits, and alternatives have been provided, discussed and informed consent has been obtained with: patient.    Plan discussed with CRNA.    CODE STATUS:    Code Status (Patient has no pulse and is not breathing): CPR (Attempt to Resuscitate)  Medical Interventions (Patient has pulse or is breathing): Full Support

## 2024-03-25 NOTE — CONSULTS
Referring Provider: Regan Porter MD    Reason for Consultation: Elevated troponin      Patient Care Team:  Lovely Jarrett MD as PCP - General (Family Medicine)      SUBJECTIVE     Chief Complaint: Vomiting    History of present illness:  Marietta Alvarez is a 59 y.o. female with a history of hypertension, hyperlipidemia, tobacco abuse who presented to [facility] with complaint of abdominal pain and vomiting.  She has had multiple ER visits for similar complaints over the last week and continued to have nausea and vomiting.  She then started to have some chest pain after her vomiting and for this reason troponin was checked and it was 145 and then 148 at outlying facility.  EKG was unrevealing and did not show any acute ST-T changes.  She did not have any further episodes of chest pain.  She was transferred to [place] she is currently chest pain-free.  High-sensitivity troponin here was 25 and 24.  Denies any previous cardiac history    IMPRESSION:  Impression:  1.No acute abdominal or pelvic abnormality.  2.Status post cholecystectomy and hysterectomy.  Review of systems:    Constitutional: No weakness, fatigue, fever, rigors, chills   Eyes: No vision changes, eye pain   ENT/oropharynx: No difficulty swallowing, sore throat, epistaxis, changes in hearing   Cardiovascular: + chest pain, chest tightness, palpitations, paroxysmal nocturnal dyspnea, orthopnea, diaphoresis, dizziness / syncopal episode   Respiratory: No shortness of breath, dyspnea on exertion, cough, wheezing, hemoptysis   Gastrointestinal: +abdominal pain,+ nausea, +vomiting, diarrhea, bloody stools   Genitourinary: No hematuria, dysuria   Neurological: No headache, tremors, numbness, one-sided weakness    Musculoskeletal: No cramps, myalgias, joint pain, joint swelling   Integument: No rash, edema        Personal History:      Past Medical History:   Diagnosis Date    Anxiety     Depression     Hyperlipidemia     Hypertension      Osteoporosis        Past Surgical History:   Procedure Laterality Date    APPENDECTOMY      CHOLECYSTECTOMY      HYSTERECTOMY      JOINT REPLACEMENT      NECK SURGERY      TONSILLECTOMY         Family History   Problem Relation Age of Onset    COPD Mother     COPD Father     Hypertension Father        Social History     Tobacco Use    Smoking status: Every Day     Current packs/day: 0.50     Average packs/day: 0.5 packs/day for 44.2 years (22.1 ttl pk-yrs)     Types: Cigarettes     Start date: 1980    Smokeless tobacco: Never   Vaping Use    Vaping status: Never Used   Substance Use Topics    Alcohol use: Never    Drug use: Yes     Comment: On suboxone since November        Home meds:  Prior to Admission medications    Medication Sig Start Date End Date Taking? Authorizing Provider   buprenorphine-naloxone (SUBOXONE) 8-2 MG per SL tablet Place 1 tablet under the tongue 2 (Two) Times a Day.    ProviderLorena MD   FLUoxetine (PROzac) 20 MG capsule Take 2 capsules by mouth Daily.    ProviderLorena MD   lisinopril (PRINIVIL,ZESTRIL) 20 MG tablet Take 1 tablet by mouth Daily.    ProviderLorena MD       Allergies:     Tape    Scheduled Meds:buprenorphine-naloxone, 1 tablet, Sublingual, BID  FLUoxetine, 40 mg, Oral, Daily  lisinopril, 20 mg, Oral, Daily  pantoprazole, 40 mg, Intravenous, Q AM  sodium chloride, 10 mL, Intravenous, Q12H      Continuous Infusions:sodium chloride, 100 mL/hr, Last Rate: 100 mL/hr (03/25/24 0832)      PRN Meds:  acetaminophen **OR** acetaminophen **OR** acetaminophen    senna-docusate sodium **AND** polyethylene glycol **AND** bisacodyl **AND** bisacodyl    nitroglycerin    ondansetron ODT **OR** ondansetron    sodium chloride    sodium chloride      OBJECTIVE    Vital Signs  Vitals:    03/25/24 0148 03/25/24 0400 03/25/24 0515 03/25/24 0900   BP: 117/83  103/63 141/95   BP Location: Left arm  Left arm Left arm   Patient Position: Lying  Lying Lying   Pulse: 88 80 83 86  "  Resp: 17  14 15   Temp: 97.9 °F (36.6 °C)  98 °F (36.7 °C) 97.6 °F (36.4 °C)   TempSrc: Oral  Oral Axillary   SpO2: 94% (!) 89% 91% 94%   Weight:   80.5 kg (177 lb 7.5 oz)    Height:           Flowsheet Rows      Flowsheet Row First Filed Value   Admission Height 170.2 cm (67\") Documented at 03/24/2024 2048   Admission Weight 83.6 kg (184 lb 4.9 oz) Documented at 03/24/2024 2048              Intake/Output Summary (Last 24 hours) at 3/25/2024 1004  Last data filed at 3/25/2024 0831  Gross per 24 hour   Intake 1650 ml   Output --   Net 1650 ml        Telemetry: Normal sinus    Physical Exam:  The patient is alert, oriented and in no distress.  Vital signs as noted above.  Head and neck revealed no carotid bruits or jugular venous distention.  No thyromegaly or lymphadenopathy is present  Lungs clear.  No wheezing.  Breath sounds are normal bilaterally.  Heart: Normal first and second heart sounds. No murmur.  No precordial rub is present.  No gallop is present.  Abdomen: Soft and nontender.  No organomegaly is present.  Extremities with good peripheral pulses without any pedal edema.  Skin: Warm and dry.  Musculoskeletal system is grossly normal.  CNS grossly normal.       Results Review:  I have personally reviewed the results from the time of this admission to 3/25/2024 10:04 EDT and agree with these findings:  []  Laboratory  []  Microbiology  []  Radiology  []  EKG/Telemetry   []  Cardiology/Vascular   []  Pathology  []  Old records  []  Other:    Most notable findings include:     Lab Results (last 24 hours)       Procedure Component Value Units Date/Time    Phosphorus [[id number]]  (Normal) Collected: 03/25/24 0912    Specimen: Blood Updated: 03/25/24 0951     Phosphorus 3.5 mg/dL     Magnesium [[id number]]  (Normal) Collected: 03/25/24 0912    Specimen: Blood Updated: 03/25/24 0951     Magnesium 2.1 mg/dL     Basic Metabolic Panel [[id number]]  (Abnormal) Collected: 03/25/24 0912    Specimen: Blood Updated: " 03/25/24 0951     Glucose 82 mg/dL      BUN 6 mg/dL      Creatinine 0.53 mg/dL      Sodium 139 mmol/L      Potassium 3.5 mmol/L      Comment: Slight hemolysis detected by analyzer. Result may be falsely elevated.        Chloride 104 mmol/L      CO2 24.0 mmol/L      Calcium 8.2 mg/dL      BUN/Creatinine Ratio 11.3     Anion Gap 11.0 mmol/L      eGFR 106.7 mL/min/1.73     Narrative:      GFR Normal >60  Chronic Kidney Disease <60  Kidney Failure <15      CBC & Differential [[id number]]  (Abnormal) Collected: 03/25/24 0912    Specimen: Blood Updated: 03/25/24 0928    Narrative:      The following orders were created for panel order CBC & Differential.  Procedure                               Abnormality         Status                     ---------                               -----------         ------                     CBC Auto Differential[[id number]]        Abnormal            Final result                 Please view results for these tests on the individual orders.    CBC Auto Differential [[id number]]  (Abnormal) Collected: 03/25/24 0912    Specimen: Blood Updated: 03/25/24 0928     WBC 6.07 10*3/mm3      RBC 4.25 10*6/mm3      Hemoglobin 12.6 g/dL      Hematocrit 41.0 %      MCV 96.5 fL      MCH 29.6 pg      MCHC 30.7 g/dL      RDW 14.6 %      RDW-SD 51.6 fl      MPV 10.1 fL      Platelets 298 10*3/mm3      Neutrophil % 57.6 %      Lymphocyte % 29.7 %      Monocyte % 10.9 %      Eosinophil % 1.2 %      Basophil % 0.3 %      Immature Grans % 0.3 %      Neutrophils, Absolute 3.50 10*3/mm3      Lymphocytes, Absolute 1.80 10*3/mm3      Monocytes, Absolute 0.66 10*3/mm3      Eosinophils, Absolute 0.07 10*3/mm3      Basophils, Absolute 0.02 10*3/mm3      Immature Grans, Absolute 0.02 10*3/mm3      nRBC 0.0 /100 WBC     High Sensitivity Troponin T 2Hr [[id number]]  (Abnormal) Collected: 03/25/24 0001    Specimen: Blood Updated: 03/25/24 0037     HS Troponin T 25 ng/L      Troponin T Delta -1 ng/L     Narrative:      High  Sensitive Troponin T Reference Range:  <14.0 ng/L- Negative Female for AMI  <22.0 ng/L- Negative Male for AMI  >=14 - Abnormal Female indicating possible myocardial injury.  >=22 - Abnormal Male indicating possible myocardial injury.   Clinicians would have to utilize clinical acumen, EKG, Troponin, and serial changes to determine if it is an Acute Myocardial Infarction or myocardial injury due to an underlying chronic condition.         High Sensitivity Troponin T [[id number]]  (Abnormal) Collected: 03/24/24 2156    Specimen: Blood from Arm, Left Updated: 03/24/24 2236     HS Troponin T 26 ng/L     Narrative:      High Sensitive Troponin T Reference Range:  <14.0 ng/L- Negative Female for AMI  <22.0 ng/L- Negative Male for AMI  >=14 - Abnormal Female indicating possible myocardial injury.  >=22 - Abnormal Male indicating possible myocardial injury.   Clinicians would have to utilize clinical acumen, EKG, Troponin, and serial changes to determine if it is an Acute Myocardial Infarction or myocardial injury due to an underlying chronic condition.         Comprehensive Metabolic Panel [[id number]]  (Abnormal) Collected: 03/24/24 2156    Specimen: Blood from Arm, Left Updated: 03/24/24 2236     Glucose 90 mg/dL      BUN 7 mg/dL      Creatinine 0.59 mg/dL      Sodium 140 mmol/L      Potassium 3.6 mmol/L      Chloride 106 mmol/L      CO2 22.0 mmol/L      Calcium 8.8 mg/dL      Total Protein 6.0 g/dL      Albumin 3.4 g/dL      ALT (SGPT) 11 U/L      AST (SGOT) 22 U/L      Alkaline Phosphatase 90 U/L      Total Bilirubin 0.3 mg/dL      Globulin 2.6 gm/dL      A/G Ratio 1.3 g/dL      BUN/Creatinine Ratio 11.9     Anion Gap 12.0 mmol/L      eGFR 104.0 mL/min/1.73     Narrative:      GFR Normal >60  Chronic Kidney Disease <60  Kidney Failure <15      BNP [[id number]]  (Abnormal) Collected: 03/24/24 2156    Specimen: Blood from Arm, Left Updated: 03/24/24 2236     proBNP 9,110.0 pg/mL     Narrative:      This assay is used as an  aid in the diagnosis of individuals suspected of having heart failure. It can be used as an aid in the diagnosis of acute decompensated heart failure (ADHF) in patients presenting with signs and symptoms of ADHF to the emergency department (ED). In addition, NT-proBNP of <300 pg/mL indicates ADHF is not likely.    Age Range Result Interpretation  NT-proBNP Concentration (pg/mL:      <50             Positive            >450                   Gray                 300-450                    Negative             <300    50-75           Positive            >900                  Gray                300-900                  Negative            <300      >75             Positive            >1800                  Gray                300-1800                  Negative            <300    CBC & Differential [[id number]]  (Abnormal) Collected: 03/24/24 2156    Specimen: Blood from Arm, Left Updated: 03/24/24 2209    Narrative:      The following orders were created for panel order CBC & Differential.  Procedure                               Abnormality         Status                     ---------                               -----------         ------                     CBC Auto Differential[[id number]]        Abnormal            Final result                 Please view results for these tests on the individual orders.    CBC Auto Differential [[id number]]  (Abnormal) Collected: 03/24/24 2156    Specimen: Blood from Arm, Left Updated: 03/24/24 2209     WBC 8.49 10*3/mm3      RBC 4.53 10*6/mm3      Hemoglobin 13.5 g/dL      Hematocrit 44.0 %      MCV 97.1 fL      MCH 29.8 pg      MCHC 30.7 g/dL      RDW 14.4 %      RDW-SD 51.8 fl      MPV 9.8 fL      Platelets 322 10*3/mm3      Neutrophil % 66.6 %      Lymphocyte % 22.6 %      Monocyte % 9.5 %      Eosinophil % 0.4 %      Basophil % 0.5 %      Immature Grans % 0.4 %      Neutrophils, Absolute 5.66 10*3/mm3      Lymphocytes, Absolute 1.92 10*3/mm3      Monocytes, Absolute 0.81  10*3/mm3      Eosinophils, Absolute 0.03 10*3/mm3      Basophils, Absolute 0.04 10*3/mm3      Immature Grans, Absolute 0.03 10*3/mm3      nRBC 0.0 /100 WBC             Imaging Results (Last 24 Hours)       Procedure Component Value Units Date/Time    XR Outside Films [[id number]] Resulted: 03/25/24 0645     Updated: 03/25/24 0645    Narrative:      This procedure was auto-finalized with no dictation required.    XR Outside Films [[id number]] Resulted: 03/25/24 0644     Updated: 03/25/24 0644    Narrative:      This procedure was auto-finalized with no dictation required.    CT Abdomen Pelvis Without Contrast [[id number]] Collected: 03/24/24 2252     Updated: 03/25/24 0547    Narrative:      CT ABDOMEN PELVIS WO CONTRAST    Date of Exam: 3/24/2024 10:32 PM EDT    Indication: Abdominal pain, acute, nonlocalized.    Comparison: None available.    Technique: Axial CT images were obtained of the abdomen and pelvis without the administration of contrast. Sagittal and coronal reconstructions were performed.  Automated exposure control and iterative reconstruction methods were used.      Findings:  There is mild dependent bibasilar atelectasis. The distal esophagus is unremarkable. The heart size is normal. There are no acute findings in the superficial soft tissues. There are no acute osseous abnormalities or destructive bone lesions. There are   mild thoracolumbar degenerative changes.    The liver is homogeneous. Patient is status post cholecystectomy. The bile ducts, pancreas, spleen, stomach and adrenal glands appear within normal limits. Kidney parenchyma is homogeneous. No urolithiasis or hydronephrosis. Urinary bladder is   nondistended. Patient is status post hysterectomy. The ovaries are not seen. The appendix is not seen. The colon is unremarkable. Small bowel is nondistended. No ascites, pneumoperitoneum or lymphadenopathy. There is mild aortoiliac atherosclerotic   disease.      Impression:      Impression:  1.No  acute abdominal or pelvic abnormality.  2.Status post cholecystectomy and hysterectomy.        Electronically Signed: Dm Nava MD    3/25/2024 5:45 AM EDT    Workstation ID: [id number]            LAB RESULTS (LAST 7 DAYS)    CBC  Results from last 7 days   Lab Units 03/25/24  0912 03/24/24  2156   WBC 10*3/mm3 6.07 8.49   RBC 10*6/mm3 4.25 4.53   HEMOGLOBIN g/dL 12.6 13.5   HEMATOCRIT % 41.0 44.0   MCV fL 96.5 97.1*   PLATELETS 10*3/mm3 298 322       BMP  Results from last 7 days   Lab Units 03/25/24  0912 03/24/24  2156   SODIUM mmol/L 139 140   POTASSIUM mmol/L 3.5 3.6   CHLORIDE mmol/L 104 106   CO2 mmol/L 24.0 22.0   BUN mg/dL 6 7   CREATININE mg/dL 0.53* 0.59   GLUCOSE mg/dL 82 90   MAGNESIUM mg/dL 2.1  --    PHOSPHORUS mg/dL 3.5  --        CMP   Results from last 7 days   Lab Units 03/25/24  0912 03/24/24  2156   SODIUM mmol/L 139 140   POTASSIUM mmol/L 3.5 3.6   CHLORIDE mmol/L 104 106   CO2 mmol/L 24.0 22.0   BUN mg/dL 6 7   CREATININE mg/dL 0.53* 0.59   GLUCOSE mg/dL 82 90   ALBUMIN g/dL  --  3.4*   BILIRUBIN mg/dL  --  0.3   ALK PHOS U/L  --  90   AST (SGOT) U/L  --  22   ALT (SGPT) U/L  --  11       BNP        TROPONIN  Results from last 7 days   Lab Units 03/25/24  0001   HSTROP T ng/L 25*       CoAg        Creatinine Clearance  Estimated Creatinine Clearance: 124.9 mL/min (A) (by C-G formula based on SCr of 0.53 mg/dL (L)).    ABG          Radiology  CT Abdomen Pelvis Without Contrast    Result Date: 3/25/2024  Impression: 1.No acute abdominal or pelvic abnormality. 2.Status post cholecystectomy and hysterectomy. Electronically Signed: Dm Nava MD  3/25/2024 5:45 AM EDT  Workstation ID: [id number]       EKG  I personally viewed and interpreted the patient's EKG/Telemetry data:  ECG 12 Lead Chest Pain   Preliminary Result   HEART RATE= 90  bpm   RR Interval= 664  ms   CT Interval= 164  ms   P Horizontal Axis= -17  deg   P Front Axis= 47  deg   QRSD Interval= 84  ms   QT Interval= 439  ms   QTcB=  539  ms   QRS Axis= 69  deg   T Wave Axis= 146  deg   - ABNORMAL ECG -   Sinus rhythm   Ventricular premature complex   Probable left atrial enlargement   Anterior infarct, age indeterminate   Abnormal T, consider ischemia, lateral leads   Prolonged QT interval   No previous ECG available for comparison   Electronically Signed By:    Date and Time of Study: 2024-03-24 21:57:23            Echocardiogram:          Stress Test:        Cardiac Catheterization:  No results found for this or any previous visit.        Other:      ASSESSMENT & PLAN:    Principal Problem:    NSTEMI (non-ST elevated myocardial infarction)  Active Problems:    Depression    Anxiety disorder    Chronic pain    Abdominal pain    Rectal bleeding    Essential hypertension    Chest pain  I believe she has chest pain due to her vomiting  I do not think this is cardiac in nature  High-sensitivity troponins have been flat  EKG is unrevealing  No further workup at this time    Nausea vomiting  Has been seen by GI  Plan for EGD    Hypertension  Continue home lisinopril    History of drug use  Currently on Suboxone      Yeni Roach MD  03/25/24  10:04 EDT

## 2024-03-26 NOTE — CASE MANAGEMENT/SOCIAL WORK
Case Management Discharge Note      Final Note: home with family         Selected Continued Care - Discharged on 3/25/2024 Admission date: 3/24/2024 - Discharge disposition: Home or Self Care         Transportation Services  Private: Car    Final Discharge Disposition Code: 01 - home or self-care

## 2024-03-27 LAB
LAB AP CASE REPORT: NORMAL
PATH REPORT.FINAL DX SPEC: NORMAL
PATH REPORT.GROSS SPEC: NORMAL

## (undated) DEVICE — BITEBLOCK ENDO W/STRAP 60F A/ LF DISP

## (undated) DEVICE — PK ENDO GI 50

## (undated) DEVICE — SINGLE-USE BIOPSY FORCEPS: Brand: RADIAL JAW 4